# Patient Record
Sex: FEMALE | Race: WHITE | Employment: UNEMPLOYED | URBAN - METROPOLITAN AREA
[De-identification: names, ages, dates, MRNs, and addresses within clinical notes are randomized per-mention and may not be internally consistent; named-entity substitution may affect disease eponyms.]

---

## 2017-01-04 ENCOUNTER — OFFICE VISIT (OUTPATIENT)
Dept: ORTHOPEDIC SURGERY | Age: 32
End: 2017-01-04

## 2017-01-04 VITALS — WEIGHT: 260 LBS | HEIGHT: 64 IN | BODY MASS INDEX: 44.39 KG/M2

## 2017-01-04 DIAGNOSIS — S93.325A LISFRANC DISLOCATION, LEFT, INITIAL ENCOUNTER: ICD-10-CM

## 2017-01-04 DIAGNOSIS — S82.64XA CLOSED NONDISPLACED FRACTURE OF LATERAL MALLEOLUS OF RIGHT FIBULA, INITIAL ENCOUNTER: Primary | ICD-10-CM

## 2017-01-04 DIAGNOSIS — S92.344A CLOSED NONDISPLACED FRACTURE OF FOURTH METATARSAL BONE OF RIGHT FOOT, INITIAL ENCOUNTER: ICD-10-CM

## 2017-01-04 PROCEDURE — 73630 X-RAY EXAM OF FOOT: CPT | Performed by: NURSE PRACTITIONER

## 2017-01-04 PROCEDURE — 73610 X-RAY EXAM OF ANKLE: CPT | Performed by: NURSE PRACTITIONER

## 2017-01-04 PROCEDURE — 99024 POSTOP FOLLOW-UP VISIT: CPT | Performed by: NURSE PRACTITIONER

## 2017-01-22 PROBLEM — Z09 FOLLOW-UP EXAM: Status: ACTIVE | Noted: 2017-01-22

## 2017-02-03 ENCOUNTER — OFFICE VISIT (OUTPATIENT)
Dept: ORTHOPEDIC SURGERY | Age: 32
End: 2017-02-03

## 2017-02-03 VITALS — HEIGHT: 64 IN | WEIGHT: 279 LBS | BODY MASS INDEX: 47.63 KG/M2

## 2017-02-03 DIAGNOSIS — S92.344A CLOSED NONDISPLACED FRACTURE OF FOURTH METATARSAL BONE OF RIGHT FOOT, INITIAL ENCOUNTER: Primary | ICD-10-CM

## 2017-02-03 DIAGNOSIS — S82.64XA CLOSED NONDISPLACED FRACTURE OF LATERAL MALLEOLUS OF RIGHT FIBULA, INITIAL ENCOUNTER: ICD-10-CM

## 2017-02-03 PROCEDURE — 73630 X-RAY EXAM OF FOOT: CPT | Performed by: ORTHOPAEDIC SURGERY

## 2017-02-03 PROCEDURE — 73610 X-RAY EXAM OF ANKLE: CPT | Performed by: ORTHOPAEDIC SURGERY

## 2017-02-03 PROCEDURE — 99214 OFFICE O/P EST MOD 30 MIN: CPT | Performed by: ORTHOPAEDIC SURGERY

## 2017-02-14 ENCOUNTER — HOSPITAL ENCOUNTER (OUTPATIENT)
Dept: OTHER | Age: 32
Discharge: HOME | End: 2017-02-28
Attending: ORTHOPAEDIC SURGERY | Admitting: ORTHOPAEDIC SURGERY

## 2017-02-14 ASSESSMENT — PAIN DESCRIPTION - ORIENTATION: ORIENTATION: LEFT;RIGHT

## 2017-02-14 ASSESSMENT — PAIN DESCRIPTION - DESCRIPTORS: DESCRIPTORS: CONSTANT

## 2017-02-14 ASSESSMENT — PAIN DESCRIPTION - PROGRESSION: CLINICAL_PROGRESSION: GRADUALLY WORSENING

## 2017-02-14 ASSESSMENT — PAIN DESCRIPTION - LOCATION: LOCATION: FOOT;ANKLE

## 2017-02-16 ENCOUNTER — HOSPITAL ENCOUNTER (OUTPATIENT)
Dept: PHYSICAL THERAPY | Age: 32
Discharge: HOME | End: 2017-02-16
Admitting: ORTHOPAEDIC SURGERY

## 2017-02-20 ENCOUNTER — HOSPITAL ENCOUNTER (OUTPATIENT)
Dept: PHYSICAL THERAPY | Age: 32
Discharge: HOME | End: 2017-02-20
Admitting: ORTHOPAEDIC SURGERY

## 2017-02-22 ENCOUNTER — HOSPITAL ENCOUNTER (OUTPATIENT)
Dept: PHYSICAL THERAPY | Age: 32
Discharge: HOME | End: 2017-02-22
Admitting: ORTHOPAEDIC SURGERY

## 2017-03-01 ENCOUNTER — HOSPITAL ENCOUNTER (OUTPATIENT)
Dept: PHYSICAL THERAPY | Age: 32
Discharge: HOME | End: 2017-03-02
Admitting: ORTHOPAEDIC SURGERY

## 2017-03-03 ENCOUNTER — HOSPITAL ENCOUNTER (OUTPATIENT)
Dept: PHYSICAL THERAPY | Age: 32
Discharge: HOME | End: 2017-03-04
Admitting: ORTHOPAEDIC SURGERY

## 2017-03-06 ENCOUNTER — HOSPITAL ENCOUNTER (OUTPATIENT)
Dept: PHYSICAL THERAPY | Age: 32
Discharge: HOME | End: 2017-03-07
Admitting: ORTHOPAEDIC SURGERY

## 2017-03-08 ENCOUNTER — HOSPITAL ENCOUNTER (OUTPATIENT)
Dept: PHYSICAL THERAPY | Age: 32
Discharge: HOME | End: 2017-03-09
Admitting: ORTHOPAEDIC SURGERY

## 2017-03-13 ENCOUNTER — HOSPITAL ENCOUNTER (OUTPATIENT)
Dept: PHYSICAL THERAPY | Age: 32
Discharge: HOME | End: 2017-03-14
Admitting: ORTHOPAEDIC SURGERY

## 2017-03-15 ENCOUNTER — HOSPITAL ENCOUNTER (OUTPATIENT)
Dept: PHYSICAL THERAPY | Age: 32
Discharge: HOME | End: 2017-03-16
Admitting: ORTHOPAEDIC SURGERY

## 2017-03-20 ENCOUNTER — HOSPITAL ENCOUNTER (OUTPATIENT)
Dept: PHYSICAL THERAPY | Age: 32
Discharge: HOME | End: 2017-03-21
Admitting: ORTHOPAEDIC SURGERY

## 2017-03-22 ENCOUNTER — HOSPITAL ENCOUNTER (OUTPATIENT)
Dept: PHYSICAL THERAPY | Age: 32
Discharge: HOME | End: 2017-03-23
Admitting: ORTHOPAEDIC SURGERY

## 2017-03-29 ENCOUNTER — HOSPITAL ENCOUNTER (OUTPATIENT)
Dept: PHYSICAL THERAPY | Age: 32
Discharge: HOME | End: 2017-03-30
Admitting: ORTHOPAEDIC SURGERY

## 2017-03-31 ENCOUNTER — HOSPITAL ENCOUNTER (OUTPATIENT)
Dept: PHYSICAL THERAPY | Age: 32
Discharge: HOME | End: 2017-04-01
Admitting: ORTHOPAEDIC SURGERY

## 2017-04-03 ENCOUNTER — HOSPITAL ENCOUNTER (OUTPATIENT)
Dept: PHYSICAL THERAPY | Age: 32
Discharge: HOME | End: 2017-04-04
Admitting: ORTHOPAEDIC SURGERY

## 2017-04-05 ENCOUNTER — HOSPITAL ENCOUNTER (OUTPATIENT)
Dept: PHYSICAL THERAPY | Age: 32
Discharge: HOME | End: 2017-04-06
Admitting: ORTHOPAEDIC SURGERY

## 2017-05-05 ENCOUNTER — OFFICE VISIT (OUTPATIENT)
Dept: ORTHOPEDIC SURGERY | Age: 32
End: 2017-05-05

## 2017-05-05 VITALS — HEIGHT: 64 IN | BODY MASS INDEX: 48.14 KG/M2 | WEIGHT: 282 LBS

## 2017-05-05 DIAGNOSIS — S92.344A CLOSED NONDISPLACED FRACTURE OF FOURTH METATARSAL BONE OF RIGHT FOOT, INITIAL ENCOUNTER: Primary | ICD-10-CM

## 2017-05-05 DIAGNOSIS — S82.65XA CLOSED NONDISPLACED FRACTURE OF LATERAL MALLEOLUS OF LEFT FIBULA, INITIAL ENCOUNTER: ICD-10-CM

## 2017-05-05 PROCEDURE — 73610 X-RAY EXAM OF ANKLE: CPT | Performed by: ORTHOPAEDIC SURGERY

## 2017-05-05 PROCEDURE — 73630 X-RAY EXAM OF FOOT: CPT | Performed by: ORTHOPAEDIC SURGERY

## 2017-05-05 PROCEDURE — 99213 OFFICE O/P EST LOW 20 MIN: CPT | Performed by: ORTHOPAEDIC SURGERY

## 2017-08-21 ENCOUNTER — OFFICE VISIT (OUTPATIENT)
Dept: GYNECOLOGY | Age: 32
End: 2017-08-21

## 2017-08-21 VITALS
OXYGEN SATURATION: 98 % | BODY MASS INDEX: 45.42 KG/M2 | HEIGHT: 66 IN | RESPIRATION RATE: 16 BRPM | SYSTOLIC BLOOD PRESSURE: 125 MMHG | TEMPERATURE: 97.7 F | WEIGHT: 282.6 LBS | HEART RATE: 89 BPM | DIASTOLIC BLOOD PRESSURE: 76 MMHG

## 2017-08-21 DIAGNOSIS — Z01.419 WELL WOMAN EXAM WITH ROUTINE GYNECOLOGICAL EXAM: Primary | ICD-10-CM

## 2017-08-21 DIAGNOSIS — E28.2 PCOS (POLYCYSTIC OVARIAN SYNDROME): ICD-10-CM

## 2017-08-21 DIAGNOSIS — N93.9 ABNORMAL UTERINE BLEEDING: ICD-10-CM

## 2017-08-21 LAB — HCG QUALITATIVE: NEGATIVE

## 2017-08-21 PROCEDURE — 99385 PREV VISIT NEW AGE 18-39: CPT | Performed by: OBSTETRICS & GYNECOLOGY

## 2017-08-21 RX ORDER — LEVOTHYROXINE SODIUM 0.05 MG/1
TABLET ORAL
COMMUNITY
Start: 2016-06-13 | End: 2018-04-12 | Stop reason: ALTCHOICE

## 2017-08-21 RX ORDER — ATOMOXETINE 80 MG/1
80 CAPSULE ORAL DAILY
COMMUNITY
Start: 2012-05-24 | End: 2018-04-12 | Stop reason: ALTCHOICE

## 2017-08-21 RX ORDER — PENICILLIN V POTASSIUM 250 MG/1
TABLET ORAL
COMMUNITY

## 2017-08-22 LAB
ESTIMATED AVERAGE GLUCOSE: 99.7 MG/DL
ESTRADIOL LEVEL: 39 PG/ML
FOLLICLE STIMULATING HORMONE: 6.9 MIU/ML
HBA1C MFR BLD: 5.1 %
LUTEINIZING HORMONE: 13.7 MIU/ML
PROLACTIN: 22.7 NG/ML
TSH REFLEX: 2.11 UIU/ML (ref 0.27–4.2)

## 2017-08-23 LAB — DHEAS (DHEA SULFATE): 101 UG/DL (ref 45–270)

## 2017-08-24 ENCOUNTER — HOSPITAL ENCOUNTER (OUTPATIENT)
Dept: ULTRASOUND IMAGING | Age: 32
Discharge: HOME | End: 2017-08-24
Attending: OBSTETRICS & GYNECOLOGY | Admitting: OBSTETRICS & GYNECOLOGY

## 2017-08-24 DIAGNOSIS — E28.2 PCOS (POLYCYSTIC OVARIAN SYNDROME): ICD-10-CM

## 2017-08-24 DIAGNOSIS — R10.9 ABDOMINAL PAIN: ICD-10-CM

## 2017-08-24 LAB
17-OH PROGESTERONE LCMS: 11.4 NG/DL
HPV COMMENT: NORMAL
HPV TYPE 16: NOT DETECTED
HPV TYPE 18: NOT DETECTED
HPVOH (OTHER TYPES): NOT DETECTED

## 2017-09-19 ENCOUNTER — OFFICE VISIT (OUTPATIENT)
Dept: GYNECOLOGY | Age: 32
End: 2017-09-19

## 2017-09-19 VITALS
HEIGHT: 66 IN | WEIGHT: 283 LBS | HEART RATE: 84 BPM | DIASTOLIC BLOOD PRESSURE: 77 MMHG | TEMPERATURE: 98.1 F | SYSTOLIC BLOOD PRESSURE: 124 MMHG | BODY MASS INDEX: 45.48 KG/M2

## 2017-09-19 DIAGNOSIS — N93.9 ABNORMAL UTERINE BLEEDING: Primary | ICD-10-CM

## 2017-09-19 PROCEDURE — 99213 OFFICE O/P EST LOW 20 MIN: CPT | Performed by: OBSTETRICS & GYNECOLOGY

## 2017-09-26 RX ORDER — MEDROXYPROGESTERONE ACETATE 10 MG/1
10 TABLET ORAL DAILY
Qty: 10 TABLET | Refills: 3 | Status: SHIPPED | OUTPATIENT
Start: 2017-09-26 | End: 2018-01-29 | Stop reason: SDUPTHER

## 2017-12-19 ENCOUNTER — OFFICE VISIT (OUTPATIENT)
Dept: GYNECOLOGY | Age: 32
End: 2017-12-19

## 2017-12-19 VITALS
HEIGHT: 66 IN | DIASTOLIC BLOOD PRESSURE: 82 MMHG | BODY MASS INDEX: 45.42 KG/M2 | SYSTOLIC BLOOD PRESSURE: 124 MMHG | TEMPERATURE: 97.2 F | HEART RATE: 89 BPM | WEIGHT: 282.6 LBS

## 2017-12-19 DIAGNOSIS — N93.9 ABNORMAL UTERINE BLEEDING: Primary | ICD-10-CM

## 2017-12-19 PROCEDURE — 99213 OFFICE O/P EST LOW 20 MIN: CPT | Performed by: OBSTETRICS & GYNECOLOGY

## 2017-12-19 PROCEDURE — G8417 CALC BMI ABV UP PARAM F/U: HCPCS | Performed by: OBSTETRICS & GYNECOLOGY

## 2017-12-19 PROCEDURE — 1036F TOBACCO NON-USER: CPT | Performed by: OBSTETRICS & GYNECOLOGY

## 2017-12-19 PROCEDURE — G8484 FLU IMMUNIZE NO ADMIN: HCPCS | Performed by: OBSTETRICS & GYNECOLOGY

## 2017-12-19 PROCEDURE — G8427 DOCREV CUR MEDS BY ELIG CLIN: HCPCS | Performed by: OBSTETRICS & GYNECOLOGY

## 2017-12-19 NOTE — PROGRESS NOTES
pts doing well on the cyclic provera. She notes that her periods are now more regular but she is continuing to have discomfort. I explained that she needs to remain on the provera while trying to manage her weight, which has remained stable though elevated. The longer she remains on the medicine the improved her pain should become. All questions answered. F/u annual gyn exam.  15 min >50% of time was spent discussing findings, management, and treatment options.

## 2018-01-29 RX ORDER — MEDROXYPROGESTERONE ACETATE 10 MG/1
10 TABLET ORAL DAILY
Qty: 10 TABLET | Refills: 3 | Status: SHIPPED | OUTPATIENT
Start: 2018-01-29 | End: 2018-05-24 | Stop reason: SDUPTHER

## 2018-01-29 NOTE — TELEPHONE ENCOUNTER
Patient is calling for a refill of her cyclic provera. Patient states that this medication has been effective. Patient last seen in office on 12/19/17. Pended medication, verified pharmacy, routing to MD to review.

## 2018-03-12 ENCOUNTER — HOSPITAL ENCOUNTER (OUTPATIENT)
Dept: MRI IMAGING | Age: 33
Discharge: HOME | End: 2018-03-12
Admitting: NURSE PRACTITIONER

## 2018-03-12 DIAGNOSIS — R10.9 ABDOMINAL PAIN, UNSPECIFIED ABDOMINAL LOCATION: ICD-10-CM

## 2018-03-12 DIAGNOSIS — R10.9 ABDOMINAL PAIN: ICD-10-CM

## 2018-03-26 ENCOUNTER — OFFICE VISIT (OUTPATIENT)
Dept: SURGERY | Age: 33
End: 2018-03-26

## 2018-03-26 VITALS
HEIGHT: 66 IN | WEIGHT: 282 LBS | SYSTOLIC BLOOD PRESSURE: 114 MMHG | DIASTOLIC BLOOD PRESSURE: 80 MMHG | BODY MASS INDEX: 45.32 KG/M2

## 2018-03-26 DIAGNOSIS — R10.31 RIGHT GROIN PAIN: Primary | ICD-10-CM

## 2018-03-26 PROCEDURE — G8484 FLU IMMUNIZE NO ADMIN: HCPCS | Performed by: SURGERY

## 2018-03-26 PROCEDURE — 1036F TOBACCO NON-USER: CPT | Performed by: SURGERY

## 2018-03-26 PROCEDURE — G8427 DOCREV CUR MEDS BY ELIG CLIN: HCPCS | Performed by: SURGERY

## 2018-03-26 PROCEDURE — G8417 CALC BMI ABV UP PARAM F/U: HCPCS | Performed by: SURGERY

## 2018-03-26 PROCEDURE — 99213 OFFICE O/P EST LOW 20 MIN: CPT | Performed by: SURGERY

## 2018-03-26 ASSESSMENT — ENCOUNTER SYMPTOMS: ABDOMINAL PAIN: 1

## 2018-03-26 NOTE — LETTER
[facility] Vascular Surgery  [address] [place] [address]  Phone: [phone]  Fax: [phone]    Diomedes Sahu MD        March 26, 2018     Diana Summers [address]  [address] [id number]    Patient: Danita Pacheco  MR Number: [id number]  YOB: 1985  Date of Visit: 3/26/2018    Dear Dr. Diana Summers:    Myriam Min was in today regarding her right groin pain. It continues to worsen and seems to be associated with a mass in the subcutaneous layer. This is likely a lipoma and we are planning to remove that mass. It is less likely a hernia but we will be prepared to proceed with hernia repair if needed. If you have questions, please do not hesitate to call me. I look forward to following Myriam Min along with you.     Sincerely,          Diomedes Sahu MD

## 2018-03-26 NOTE — LETTER
Surgery Scheduling Form:      DEMOGRAPHICS:                                                                                                         .    Patient Name:  Chelsey aDvis  Patient :  1985   Patient SS#:      Patient Phone:  [phone] (home) [phone] (work) Alt. Patient Phone:                     Patient Address:  [address]    PCP:  Lord Milena CNP  Insurance:  Payor: Jaki Noe / Plan: Velasco Low / Product Type: *No Product type* /   Insurance ID Number:    Payor/Plan Subscr  Sex Relation Sub. Ins. ID Effective Group Num   1. CARESOUR[employer]IGH - * ROBERT PAZ T 1985 Female Self [id number] [id number]         DIAGNOSIS & PROCEDURE:                                                                                       .    Diagnosis:   Right groin pain, right groin mass  Operation:  Right groin exploration, possible hernia repair, possible lipoma removal  Location:  [facility]  Surgeon: Machelle Zavala MD    [facility] INFORMATION:                                                                                    .    Surgeon's Scheduling Instruction:  elective  Requested Date: 18   OR Time: 8:45          Patient Arrival Time: 7:15  OR Time Required:  60  Minutes  Anesthesia:  MAC/TIVA  Equipment:                                                           SA Required:  Yes  Status:  Outpatient          Standard C-Arm:  No  PAT Required:   Yes                             Best Time to Call:  10 am  Patient Requested to see PCP for Pre-op H & P:  Dr Aimee Maya will do H & P  Special Comments:     PT NOTIFIED Claudette Taylor MD       PRE-CERTIFICATION INFORMATION:                                                                           .    Procedure:       CPT Code Modifier

## 2018-03-26 NOTE — LETTER
[facility] Vascular Surgery  [address] [address]  Phone: [phone]  Fax: [phone]        March 26, 2018    Fawn Ornelas  [address]  [address] [facility] [address]      Dear Mc Fierro: The following arrangements have been completed for your procedure:  PLEASE FOLLOW THE BELOW DIRECTIONS CAREFULLY:                               Hospital: [facility]                                            [address] [facility] [facility] [address]                             Date of Surgery:  04-                             Time of Surgery:  8:45 AM                                Arrival Time at Hospital:  7:15 AM    1. You are to have NOTHING TO EAT OR DRINK after midnight the night before your procedure. You may take any Heart or Blood Pressure medications with a sip of water to wash them down. Please refrain from any Alcohol or Caffeine or caffeine products for 24 hours prior to surgery. 2.  You will need someone to drive you home following your procedure. 3.  If you develop a fever, please notify our office as soon as possible. Please keep in mind that emergencies requiring immediate intervention do occur, which creates delays. The staff will do everything possible to ensure your procedure remains on time. Please remember to bring a Photo ID & Insurance Card with you. Please  report in at the Surgery Desk down the right-hand hallway on the first floor. If the hospital requires any further information/testing, they will contact you. If you have any questions or concerns, please don't hesitate to call.     Sincerely,      Aaron Medina - Patient Services Specialist for:  Karthik Hutchison MD

## 2018-03-26 NOTE — PROGRESS NOTES
1. Right groin pain             Plan:      Right groin exploration    Removal of lipoma, possible hernia repair    The risks, benefits and alternatives to the planned procedure were discussed. Patient expressed an understanding and is willing to proceed.

## 2018-04-12 ENCOUNTER — PAT TELEPHONE (OUTPATIENT)
Dept: PREADMISSION TESTING | Age: 33
End: 2018-04-12

## 2018-04-12 VITALS — HEIGHT: 66 IN | WEIGHT: 279 LBS | BODY MASS INDEX: 44.84 KG/M2

## 2018-04-12 RX ORDER — IBUPROFEN/DIPHENHYDRAMINE CIT 200MG-38MG
1 TABLET ORAL DAILY
COMMUNITY

## 2018-04-12 RX ORDER — LEVOTHYROXINE SODIUM 0.1 MG/1
100 TABLET ORAL DAILY
COMMUNITY

## 2018-04-12 RX ORDER — IRON POLYSACCHARIDE COMPLEX 180 MG
1 CAPSULE ORAL DAILY
COMMUNITY
Start: 2018-02-03 | End: 2019-08-27 | Stop reason: ALTCHOICE

## 2018-04-12 ASSESSMENT — PAIN SCALES - GENERAL: PAINLEVEL_OUTOF10: 7

## 2018-04-12 ASSESSMENT — PAIN DESCRIPTION - PAIN TYPE: TYPE: ACUTE PAIN

## 2018-04-12 ASSESSMENT — PAIN - FUNCTIONAL ASSESSMENT: PAIN_FUNCTIONAL_ASSESSMENT: 0-10

## 2018-04-12 ASSESSMENT — PAIN DESCRIPTION - DESCRIPTORS: DESCRIPTORS: STABBING

## 2018-04-12 ASSESSMENT — PAIN DESCRIPTION - DIRECTION: RADIATING_TOWARDS: PELVIC AREA

## 2018-04-13 ENCOUNTER — HOSPITAL ENCOUNTER (OUTPATIENT)
Dept: SURGERY | Age: 33
Discharge: HOME | End: 2018-04-13
Attending: SURGERY | Admitting: SURGERY

## 2018-04-13 VITALS
RESPIRATION RATE: 16 BRPM | HEIGHT: 66 IN | DIASTOLIC BLOOD PRESSURE: 89 MMHG | OXYGEN SATURATION: 96 % | TEMPERATURE: 97.2 F | WEIGHT: 279 LBS | BODY MASS INDEX: 44.84 KG/M2 | SYSTOLIC BLOOD PRESSURE: 146 MMHG | HEART RATE: 97 BPM

## 2018-04-13 DIAGNOSIS — D17.1 LIPOMA OF TORSO: Primary | ICD-10-CM

## 2018-04-13 LAB
HCT VFR BLD CALC: 36.6 % (ref 36–48)
HEMOGLOBIN: 11.7 G/DL (ref 12–16)
MCH RBC QN AUTO: 23.2 PG (ref 26–34)
MCHC RBC AUTO-ENTMCNC: 31.9 G/DL (ref 31–36)
MCV RBC AUTO: 72.9 FL (ref 80–100)
PDW BLD-RTO: 22 % (ref 12.4–15.4)
PLATELET # BLD: 85 K/UL (ref 135–450)
PLATELET SLIDE REVIEW: ABNORMAL
PMV BLD AUTO: 10.5 FL (ref 5–10.5)
PREGNANCY, URINE: NEGATIVE
RBC # BLD: 5.03 M/UL (ref 4–5.2)
SLIDE REVIEW: ABNORMAL
WBC # BLD: 8.6 K/UL (ref 4–11)

## 2018-04-13 PROCEDURE — 22903 EXC ABD LES SC 3 CM/>: CPT | Performed by: SURGERY

## 2018-04-13 RX ORDER — FENTANYL CITRATE 50 UG/ML
25 INJECTION, SOLUTION INTRAMUSCULAR; INTRAVENOUS EVERY 5 MIN PRN
Status: COMPLETED | OUTPATIENT
Start: 2018-04-13 | End: 2018-04-13

## 2018-04-13 RX ORDER — FENTANYL CITRATE 50 UG/ML
25 INJECTION, SOLUTION INTRAMUSCULAR; INTRAVENOUS EVERY 5 MIN PRN
Status: DISCONTINUED | OUTPATIENT
Start: 2018-04-13 | End: 2018-04-14 | Stop reason: HOSPADM

## 2018-04-13 RX ORDER — SODIUM CHLORIDE 0.9 % (FLUSH) 0.9 %
10 SYRINGE (ML) INJECTION EVERY 12 HOURS SCHEDULED
Status: DISCONTINUED | OUTPATIENT
Start: 2018-04-13 | End: 2018-04-14 | Stop reason: HOSPADM

## 2018-04-13 RX ORDER — SODIUM CHLORIDE 9 MG/ML
INJECTION, SOLUTION INTRAVENOUS CONTINUOUS
Status: DISCONTINUED | OUTPATIENT
Start: 2018-04-13 | End: 2018-04-14 | Stop reason: HOSPADM

## 2018-04-13 RX ORDER — OXYCODONE HYDROCHLORIDE AND ACETAMINOPHEN 5; 325 MG/1; MG/1
1 TABLET ORAL EVERY 6 HOURS PRN
Qty: 12 TABLET | Refills: 0 | Status: SHIPPED | OUTPATIENT
Start: 2018-04-13 | End: 2018-04-16

## 2018-04-13 RX ORDER — ONDANSETRON 2 MG/ML
4 INJECTION INTRAMUSCULAR; INTRAVENOUS
Status: ACTIVE | OUTPATIENT
Start: 2018-04-13 | End: 2018-04-13

## 2018-04-13 RX ORDER — OXYCODONE HYDROCHLORIDE AND ACETAMINOPHEN 5; 325 MG/1; MG/1
2 TABLET ORAL PRN
Status: ACTIVE | OUTPATIENT
Start: 2018-04-13 | End: 2018-04-13

## 2018-04-13 RX ORDER — OXYCODONE HYDROCHLORIDE AND ACETAMINOPHEN 5; 325 MG/1; MG/1
1 TABLET ORAL PRN
Status: ACTIVE | OUTPATIENT
Start: 2018-04-13 | End: 2018-04-13

## 2018-04-13 RX ORDER — SODIUM CHLORIDE 0.9 % (FLUSH) 0.9 %
10 SYRINGE (ML) INJECTION PRN
Status: DISCONTINUED | OUTPATIENT
Start: 2018-04-13 | End: 2018-04-14 | Stop reason: HOSPADM

## 2018-04-13 RX ADMIN — FENTANYL CITRATE 25 MCG: 50 INJECTION, SOLUTION INTRAMUSCULAR; INTRAVENOUS at 10:35

## 2018-04-13 RX ADMIN — FENTANYL CITRATE 25 MCG: 50 INJECTION, SOLUTION INTRAMUSCULAR; INTRAVENOUS at 10:45

## 2018-04-13 RX ADMIN — FENTANYL CITRATE 25 MCG: 50 INJECTION, SOLUTION INTRAMUSCULAR; INTRAVENOUS at 10:40

## 2018-04-13 RX ADMIN — FENTANYL CITRATE 25 MCG: 50 INJECTION, SOLUTION INTRAMUSCULAR; INTRAVENOUS at 10:51

## 2018-04-13 RX ADMIN — FENTANYL CITRATE 25 MCG: 50 INJECTION, SOLUTION INTRAMUSCULAR; INTRAVENOUS at 10:30

## 2018-04-13 ASSESSMENT — PAIN DESCRIPTION - ORIENTATION: ORIENTATION: RIGHT

## 2018-04-13 ASSESSMENT — PAIN SCALES - GENERAL
PAINLEVEL_OUTOF10: 7
PAINLEVEL_OUTOF10: 4
PAINLEVEL_OUTOF10: 7
PAINLEVEL_OUTOF10: 5
PAINLEVEL_OUTOF10: 10
PAINLEVEL_OUTOF10: 7
PAINLEVEL_OUTOF10: 10

## 2018-04-13 ASSESSMENT — PAIN - FUNCTIONAL ASSESSMENT: PAIN_FUNCTIONAL_ASSESSMENT: 0-10

## 2018-04-13 ASSESSMENT — PAIN DESCRIPTION - PAIN TYPE: TYPE: ACUTE PAIN;SURGICAL PAIN

## 2018-04-13 ASSESSMENT — ENCOUNTER SYMPTOMS: SHORTNESS OF BREATH: 0

## 2018-04-13 ASSESSMENT — PAIN DESCRIPTION - LOCATION: LOCATION: GROIN

## 2018-04-13 ASSESSMENT — LIFESTYLE VARIABLES: SMOKING_STATUS: 0

## 2018-04-23 ENCOUNTER — OFFICE VISIT (OUTPATIENT)
Dept: SURGERY | Age: 33
End: 2018-04-23

## 2018-04-23 DIAGNOSIS — D17.1 LIPOMA OF TORSO: Primary | ICD-10-CM

## 2018-04-23 PROCEDURE — 99024 POSTOP FOLLOW-UP VISIT: CPT | Performed by: SURGERY

## 2018-05-24 RX ORDER — MEDROXYPROGESTERONE ACETATE 10 MG/1
TABLET ORAL
Qty: 10 TABLET | Refills: 2 | Status: SHIPPED | OUTPATIENT
Start: 2018-05-24 | End: 2018-08-22 | Stop reason: SDUPTHER

## 2018-08-22 ENCOUNTER — OFFICE VISIT (OUTPATIENT)
Dept: GYNECOLOGY | Age: 33
End: 2018-08-22

## 2018-08-22 VITALS
TEMPERATURE: 97.1 F | SYSTOLIC BLOOD PRESSURE: 124 MMHG | DIASTOLIC BLOOD PRESSURE: 84 MMHG | RESPIRATION RATE: 16 BRPM | HEIGHT: 66 IN | BODY MASS INDEX: 43.84 KG/M2 | HEART RATE: 92 BPM | WEIGHT: 272.8 LBS | OXYGEN SATURATION: 98 %

## 2018-08-22 DIAGNOSIS — N93.9 ABNORMAL UTERINE BLEEDING: Primary | ICD-10-CM

## 2018-08-22 DIAGNOSIS — Z01.419 WELL WOMAN EXAM WITH ROUTINE GYNECOLOGICAL EXAM: ICD-10-CM

## 2018-08-22 PROCEDURE — 99395 PREV VISIT EST AGE 18-39: CPT | Performed by: OBSTETRICS & GYNECOLOGY

## 2018-08-22 RX ORDER — MEDROXYPROGESTERONE ACETATE 10 MG/1
TABLET ORAL
Qty: 10 TABLET | Refills: 11 | Status: SHIPPED | OUTPATIENT
Start: 2018-08-22 | End: 2019-08-27 | Stop reason: SDUPTHER

## 2018-08-22 NOTE — PROGRESS NOTES
Subjective:      Patient ID: Andria Perez is a 35 y.o. female. Gynecologic Exam     Other     pts here for annual gyn exam.  Notes that she is having regular monthly menses but in July she had an additional 3 days of bleeding at the end of the month. Denies complaints. Going to [place] at the end of the month for 9 days. Review of Systems Pertinent review of systems items discussed above. All others systems items not discussed above were negative. Objective:   Physical Exam   Constitutional: She is oriented to person, place, and time. She appears well-developed and well-nourished. HENT:   Head: Normocephalic and atraumatic. Neck: No tracheal deviation present. No thyromegaly present. Cardiovascular: Normal rate, regular rhythm and normal heart sounds. No murmur heard. Pulmonary/Chest: Effort normal and breath sounds normal. No respiratory distress. She has no wheezes. She has no rales. Right breast exhibits no mass, no nipple discharge and no skin change. Left breast exhibits no mass, no nipple discharge and no skin change. Abdominal: Soft. She exhibits no distension and no mass. There is no tenderness. There is no rebound. Genitourinary: Vagina normal and uterus normal. Rectal exam shows no external hemorrhoid. No breast tenderness (no masses), discharge or bleeding. There is no lesion on the right labia. There is no lesion on the left labia. Uterus is not deviated, not enlarged, not fixed and not tender. Cervix exhibits no motion tenderness, no discharge and no friability. Right adnexum displays no mass and no tenderness. Left adnexum displays no mass and no tenderness. No foreign body in the vagina. No vaginal discharge found. Genitourinary Comments: Pap performed. Musculoskeletal: Normal range of motion. Lymphadenopathy:     She has no cervical adenopathy. Neurological: She is alert and oriented to person, place, and time.        Assessment:      Normal gyn exam      Plan:

## 2018-08-24 LAB
HPV COMMENT: NORMAL
HPV TYPE 16: NOT DETECTED
HPV TYPE 18: NOT DETECTED
HPVOH (OTHER TYPES): NOT DETECTED

## 2018-09-27 PROBLEM — Z09 FOLLOW-UP EXAM: Status: RESOLVED | Noted: 2017-01-22 | Resolved: 2018-09-27

## 2019-03-14 ENCOUNTER — OFFICE VISIT (OUTPATIENT)
Dept: SURGERY | Age: 34
End: 2019-03-14
Payer: COMMERCIAL

## 2019-03-14 VITALS
HEIGHT: 66 IN | DIASTOLIC BLOOD PRESSURE: 80 MMHG | BODY MASS INDEX: 43.71 KG/M2 | WEIGHT: 272 LBS | SYSTOLIC BLOOD PRESSURE: 124 MMHG

## 2019-03-14 DIAGNOSIS — R10.31 RIGHT GROIN PAIN: Primary | ICD-10-CM

## 2019-03-14 PROCEDURE — 1036F TOBACCO NON-USER: CPT | Performed by: SURGERY

## 2019-03-14 PROCEDURE — G8427 DOCREV CUR MEDS BY ELIG CLIN: HCPCS | Performed by: SURGERY

## 2019-03-14 PROCEDURE — 99213 OFFICE O/P EST LOW 20 MIN: CPT | Performed by: SURGERY

## 2019-03-14 PROCEDURE — G8417 CALC BMI ABV UP PARAM F/U: HCPCS | Performed by: SURGERY

## 2019-03-14 PROCEDURE — G8484 FLU IMMUNIZE NO ADMIN: HCPCS | Performed by: SURGERY

## 2019-03-14 RX ORDER — LUBIPROSTONE 24 UG/1
CAPSULE, GELATIN COATED ORAL
Refills: 2 | COMMUNITY
Start: 2019-02-21 | End: 2019-08-27 | Stop reason: ALTCHOICE

## 2019-03-14 ASSESSMENT — ENCOUNTER SYMPTOMS: ABDOMINAL PAIN: 1

## 2019-03-26 ENCOUNTER — HOSPITAL ENCOUNTER (OUTPATIENT)
Dept: CT IMAGING | Age: 34
Discharge: HOME | End: 2019-03-26
Payer: COMMERCIAL

## 2019-03-26 DIAGNOSIS — R10.31 RIGHT GROIN PAIN: ICD-10-CM

## 2019-03-26 PROCEDURE — 6360000004 HC RX CONTRAST MEDICATION: Performed by: SURGERY

## 2019-03-26 PROCEDURE — 72193 CT PELVIS W/DYE: CPT

## 2019-03-26 RX ADMIN — IOPAMIDOL 75 ML: 755 INJECTION, SOLUTION INTRAVENOUS at 13:33

## 2019-03-26 RX ADMIN — IOHEXOL 50 ML: 240 INJECTION, SOLUTION INTRATHECAL; INTRAVASCULAR; INTRAVENOUS; ORAL at 13:33

## 2019-05-21 LAB
C-REACTIVE PROTEIN: 15.6 MG/L (ref 0–5.1)
C4 COMPLEMENT: 32.5 MG/DL (ref 10–40)
FERRITIN: 40 NG/ML (ref 15–150)
FOLATE: >20 NG/ML (ref 4.78–24.2)
HEMOGLOBIN: 13.7 G/DL (ref 12–16)
IRON SATURATION: 32 % (ref 15–50)
IRON: 99 UG/DL (ref 37–145)
T4 FREE: 1.5 NG/DL (ref 0.9–1.8)
TOTAL IRON BINDING CAPACITY: 313 UG/DL (ref 260–445)
TSH SERPL DL<=0.05 MIU/L-ACNC: 2.17 UIU/ML (ref 0.27–4.2)
VITAMIN B-12: 582 PG/ML (ref 211–911)

## 2019-05-24 LAB
COPROPORPHYRIN I URINE (/MOL CRT): 3 UMOL/MOL CRT (ref 0–6)
COPROPORPHYRIN III URINE (/MOL CRT): 8 UMOL/MOL CRT (ref 0–14)
CREATININE 24 HOUR URINE: NORMAL MG/D (ref 700–1600)
CREATININE URINE: 166 MG/DL
HEPTACARBOXYLATE PORPHYRIN 24 HOUR URINE: 0 UMOL/MOL CRT (ref 0–2)
HOURS COLLECTED: NORMAL HR
PGB/24HR: NORMAL UMOL/D (ref 0–11)
PGB/L: 4.7 UMOL/L (ref 0–8.8)
PORPHYRIN URINE INTERPRETATION: NEGATIVE
URINE TOTAL VOLUME: NORMAL ML
UROPORPHYRIN 24 HOUR URINE: 1 UMOL/MOL CRT (ref 0–4)

## 2019-05-25 LAB
ARSENIC BLOOD: <10 UG/L (ref 0–12)
LEAD LEVEL BLOOD: <2 UG/DL (ref 0–4.9)
MERCURY BLOOD: <2.5 UG/L (ref 0–10)

## 2019-08-27 ENCOUNTER — OFFICE VISIT (OUTPATIENT)
Dept: GYNECOLOGY | Age: 34
End: 2019-08-27
Payer: COMMERCIAL

## 2019-08-27 VITALS
OXYGEN SATURATION: 96 % | BODY MASS INDEX: 44.65 KG/M2 | WEIGHT: 277.8 LBS | DIASTOLIC BLOOD PRESSURE: 86 MMHG | RESPIRATION RATE: 16 BRPM | SYSTOLIC BLOOD PRESSURE: 126 MMHG | HEART RATE: 87 BPM | TEMPERATURE: 97.9 F | HEIGHT: 66 IN

## 2019-08-27 DIAGNOSIS — N93.9 ABNORMAL UTERINE BLEEDING: ICD-10-CM

## 2019-08-27 DIAGNOSIS — Z01.419 WELL WOMAN EXAM WITH ROUTINE GYNECOLOGICAL EXAM: Primary | ICD-10-CM

## 2019-08-27 PROCEDURE — 99395 PREV VISIT EST AGE 18-39: CPT | Performed by: OBSTETRICS & GYNECOLOGY

## 2019-08-27 RX ORDER — MEDROXYPROGESTERONE ACETATE 10 MG/1
TABLET ORAL
Qty: 10 TABLET | Refills: 11 | Status: SHIPPED | OUTPATIENT
Start: 2019-08-27 | End: 2019-08-30 | Stop reason: SDUPTHER

## 2019-08-29 ENCOUNTER — TELEPHONE (OUTPATIENT)
Dept: FAMILY MEDICINE CLINIC | Age: 34
End: 2019-08-29

## 2019-08-30 RX ORDER — MEDROXYPROGESTERONE ACETATE 10 MG/1
TABLET ORAL
Qty: 10 TABLET | Refills: 11 | Status: SHIPPED | OUTPATIENT
Start: 2019-08-30 | End: 2021-09-28 | Stop reason: SDUPTHER

## 2019-12-03 ENCOUNTER — HOSPITAL ENCOUNTER (OUTPATIENT)
Dept: NEUROLOGY | Age: 34
Discharge: HOME | End: 2019-12-03
Payer: COMMERCIAL

## 2019-12-03 PROCEDURE — 95886 MUSC TEST DONE W/N TEST COMP: CPT

## 2019-12-03 PROCEDURE — 95864 NEEDLE EMG 4 EXTREMITIES: CPT

## 2019-12-03 PROCEDURE — 95911 NRV CNDJ TEST 9-10 STUDIES: CPT

## 2020-07-14 ENCOUNTER — HOSPITAL ENCOUNTER (OUTPATIENT)
Dept: ULTRASOUND IMAGING | Age: 35
Discharge: HOME | End: 2020-07-14
Payer: COMMERCIAL

## 2020-07-14 PROCEDURE — 76700 US EXAM ABDOM COMPLETE: CPT

## 2020-07-14 PROCEDURE — 76856 US EXAM PELVIC COMPLETE: CPT

## 2020-09-08 ENCOUNTER — OFFICE VISIT (OUTPATIENT)
Dept: GYNECOLOGY | Age: 35
End: 2020-09-08
Payer: COMMERCIAL

## 2020-09-08 VITALS
RESPIRATION RATE: 16 BRPM | WEIGHT: 268 LBS | DIASTOLIC BLOOD PRESSURE: 76 MMHG | TEMPERATURE: 97.2 F | HEART RATE: 88 BPM | OXYGEN SATURATION: 99 % | BODY MASS INDEX: 43.07 KG/M2 | HEIGHT: 66 IN | SYSTOLIC BLOOD PRESSURE: 109 MMHG

## 2020-09-08 PROCEDURE — 99395 PREV VISIT EST AGE 18-39: CPT | Performed by: OBSTETRICS & GYNECOLOGY

## 2020-09-08 RX ORDER — OMEPRAZOLE 40 MG/1
CAPSULE, DELAYED RELEASE ORAL
COMMUNITY
Start: 2020-09-06

## 2020-09-08 RX ORDER — MEDROXYPROGESTERONE ACETATE 10 MG/1
10 TABLET ORAL DAILY
Qty: 10 TABLET | Refills: 11 | Status: SHIPPED | OUTPATIENT
Start: 2020-09-08 | End: 2020-11-04

## 2020-09-08 NOTE — PROGRESS NOTES
Subjective:      Patient ID: Samuel Castro is a 28 y.o. female. HPI  pts here for annual gyn exam.  She denies complaints. Never had intercourse. Recently returned from trip to [facility]. Still having pain on right side x 5 years, constant. US normal.  Offered pt an op lap- would need pre op appt. Doing well with the cyclic provera. Review of Systems Pertinent review of systems items discussed above. All others systems items not discussed above were negative. Objective:   Physical Exam  Constitutional:       Appearance: She is well-developed. HENT:      Head: Normocephalic and atraumatic. Neck:      Thyroid: No thyromegaly. Trachea: No tracheal deviation. Cardiovascular:      Rate and Rhythm: Normal rate and regular rhythm. Heart sounds: Normal heart sounds. No murmur. Pulmonary:      Effort: Pulmonary effort is normal. No respiratory distress. Breath sounds: Normal breath sounds. No wheezing or rales. Chest:      Breasts:         Right: No mass, nipple discharge or skin change. Left: No mass, nipple discharge or skin change. Abdominal:      General: There is no distension. Palpations: Abdomen is soft. There is no mass. Tenderness: There is no abdominal tenderness. There is no rebound. Genitourinary:     Labia:         Right: No lesion. Left: No lesion. Vagina: Normal. No foreign body. No vaginal discharge. Cervix: No cervical motion tenderness, discharge or friability. Uterus: Not deviated, not enlarged, not fixed and not tender. Adnexa:         Right: No mass or tenderness. Left: No mass or tenderness. Rectum: Normal. No external hemorrhoid. Comments: Pap performed. Musculoskeletal: Normal range of motion. Lymphadenopathy:      Cervical: No cervical adenopathy. Neurological:      Mental Status: She is alert and oriented to person, place, and time.          Assessment:   Normal gyn exam, pelvic pain     Plan:   rx Provera. F/u annual gyn exam, sooner if needed.        Brandi Felder MD

## 2020-10-29 ENCOUNTER — TELEPHONE (OUTPATIENT)
Dept: GYNECOLOGY | Age: 35
End: 2020-10-29

## 2020-10-29 NOTE — TELEPHONE ENCOUNTER
Patient is calling to say her pharmacy is saying they don't have her medroxyPROGESTERone (PROVERA) 10 MG tablet  - we have a confirmed receipt -Receipt confirmed by pharmacy (9/8/2020  4:15 PM EDT) I did tell her to call her pharmacy.  Please advise

## 2020-10-30 ENCOUNTER — TELEPHONE (OUTPATIENT)
Dept: FAMILY MEDICINE CLINIC | Age: 35
End: 2020-10-30

## 2020-11-04 ENCOUNTER — OFFICE VISIT (OUTPATIENT)
Dept: GYNECOLOGY | Age: 35
End: 2020-11-04
Payer: COMMERCIAL

## 2020-11-04 VITALS
WEIGHT: 270 LBS | HEART RATE: 89 BPM | HEIGHT: 66 IN | TEMPERATURE: 98.6 F | SYSTOLIC BLOOD PRESSURE: 116 MMHG | DIASTOLIC BLOOD PRESSURE: 78 MMHG | BODY MASS INDEX: 43.39 KG/M2

## 2020-11-04 PROCEDURE — 99214 OFFICE O/P EST MOD 30 MIN: CPT | Performed by: OBSTETRICS & GYNECOLOGY

## 2020-11-04 PROCEDURE — G8417 CALC BMI ABV UP PARAM F/U: HCPCS | Performed by: OBSTETRICS & GYNECOLOGY

## 2020-11-04 PROCEDURE — 1036F TOBACCO NON-USER: CPT | Performed by: OBSTETRICS & GYNECOLOGY

## 2020-11-04 PROCEDURE — G8427 DOCREV CUR MEDS BY ELIG CLIN: HCPCS | Performed by: OBSTETRICS & GYNECOLOGY

## 2020-11-04 PROCEDURE — G8484 FLU IMMUNIZE NO ADMIN: HCPCS | Performed by: OBSTETRICS & GYNECOLOGY

## 2020-11-04 RX ORDER — SODIUM CHLORIDE 0.9 % (FLUSH) 0.9 %
10 SYRINGE (ML) INJECTION EVERY 12 HOURS SCHEDULED
Status: CANCELLED | OUTPATIENT
Start: 2020-11-04

## 2020-11-04 RX ORDER — SODIUM CHLORIDE 0.9 % (FLUSH) 0.9 %
10 SYRINGE (ML) INJECTION PRN
Status: CANCELLED | OUTPATIENT
Start: 2020-11-04

## 2020-11-04 RX ORDER — SODIUM CHLORIDE, SODIUM LACTATE, POTASSIUM CHLORIDE, CALCIUM CHLORIDE 600; 310; 30; 20 MG/100ML; MG/100ML; MG/100ML; MG/100ML
INJECTION, SOLUTION INTRAVENOUS CONTINUOUS
Status: CANCELLED | OUTPATIENT
Start: 2020-11-04

## 2020-11-04 NOTE — H&P
HISTORY AND PHYSICAL             Date: 11/4/2020        Patient Name: Yolis Hernandez     YOB: 1985      Age:  28 y.o. Chief Complaint     Chief Complaint   Patient presents with    Pelvic Pain     Right side. Pt is here to discuss surgery. LMP 10/14/2020. last pap 9/8/20      pelvic pain    History Obtained From   patient    History of Present Illness   G0 presents with a 5 year h/o right sided pain, constant, no help with otc pain meds. US normal.  Worse with position changes. Menses q month, lasting 4-5 days. Past Medical History     Past Medical History:   Diagnosis Date    ADHD (attention deficit hyperactivity disorder)     Anxiety     Auditory processing disorder     Blood disorder     Depression     Hypothyroidism     hypothyroid    IBS (irritable bowel syndrome)     ITP (idiopathic thrombocytopenic purpura)     S/P splenectomy         Past Surgical History     Past Surgical History:   Procedure Laterality Date    BONE MARROW BIOPSY  2004    CHOLECYSTECTOMY      LIPOMA RESECTION  04/13/2018    right groin exploration lipoma removal    SPLENECTOMY  2007        Medications Prior to Admission     Prior to Admission medications    Medication Sig Start Date End Date Taking? Authorizing Provider   omeprazole (PRILOSEC) 40 MG delayed release capsule  9/6/20  Yes Historical Provider, MD   medroxyPROGESTERone (PROVERA) 10 MG tablet TAKE ONE TABLET BY MOUTH DAILY FOR THE FIRST 10 DAYS ON THE MONTH 8/30/19  Yes Gerardo Roberts MD   Multiple Vitamins-Minerals (ADULT ONE DAILY GUMMIES) CHEW Take 1 tablet by mouth daily   Yes Historical Provider, MD   levothyroxine (SYNTHROID) 100 MCG tablet Take 100 mcg by mouth daily   Yes Historical Provider, MD   penicillin v potassium (VEETID) 250 MG tablet Take 250 mg by mouth every 12 hours.    Yes Historical Provider, MD   STRATTERA 80 MG capsule TAKE ONE CAPSULE BY MOUTH DAILY 12/18/16  Yes Xiomara Parikh MD   venlafaxine (EFFEXOR XR) 150 MG extended release capsule Take 1 capsule by mouth daily (with breakfast)  Patient taking differently: Take 150 mg by mouth 2 times daily  10/7/16  Yes Mattie Sandoval MD   polyethylene glycol (GLYCOLAX) powder Take 17 g by mouth nightly   Yes Historical Provider, MD        Allergies   Immune globulin; Immune globulins; Rho (d) immune globulin; and Winrho sdf [rho d immune globulin]    Social History     Social History     Tobacco History     Smoking Status  Never Smoker    Smokeless Tobacco Use  Never Used          Alcohol History     Alcohol Use Status  No          Drug Use     Drug Use Status  No          Sexual Activity     Sexually Active  Not Currently                Family History     Family History   Problem Relation Age of Onset    Diabetes Mother     Hypertension Mother     Irritable Bowel Syndrome Mother     Cancer Father         leukemia    Hypertension Father     Diabetes Brother        Review of Systems   Review of Systems    Pertinent review of systems items discussed above. All others systems items not discussed above were negative. Physical Exam   /78 (Site: Left Upper Arm, Position: Sitting)   Pulse 89   Temp 98.6 °F (37 °C)   Ht 5' 6\" (1.676 m)   Wt 270 lb (122.5 kg)   BMI 43.58 kg/m²     Physical Exam      CV- RRR  Resp- sotero CTA  Labs    No results found for this or any previous visit (from the past 24 hour(s)). Imaging/Diagnostics Last 24 Hours   No results found. Assessment    right sided pelvic pain, h/o ITP    Plan   1. Offered op lap. I discussed the technique, recovery, and risks with patient. They include but are not limited to bleeding, infection, damage to internal organs (e.g., bladder, bowel, ureters). Patient voiced understanding and agrees to proceed. 2. Will add iv pre op abx  3. 30 min >50% of time was spent discussing findings, management, and treatment options.   4.     Consultations Ordered:  None    Electronically signed by Rj Cole MD

## 2020-11-27 ENCOUNTER — OFFICE VISIT (OUTPATIENT)
Dept: PRIMARY CARE CLINIC | Age: 35
End: 2020-11-27
Payer: COMMERCIAL

## 2020-11-27 PROCEDURE — 99211 OFF/OP EST MAY X REQ PHY/QHP: CPT | Performed by: NURSE PRACTITIONER

## 2020-11-27 PROCEDURE — G8417 CALC BMI ABV UP PARAM F/U: HCPCS | Performed by: NURSE PRACTITIONER

## 2020-11-27 PROCEDURE — G8428 CUR MEDS NOT DOCUMENT: HCPCS | Performed by: NURSE PRACTITIONER

## 2020-11-27 NOTE — PROGRESS NOTES
Patient presented to [facility] drive up clinic for preop testing. Patient was swabbed and given information advising them to remain isolated until procedure date.

## 2020-11-28 LAB — SARS-COV-2: DETECTED

## 2020-11-30 ENCOUNTER — TELEPHONE (OUTPATIENT)
Dept: FAMILY MEDICINE CLINIC | Age: 35
End: 2020-11-30

## 2020-11-30 NOTE — TELEPHONE ENCOUNTER
Pt and doc aware pt was told to quarantine and read note pt was told  to call back to schedule at later time when symptoms disappear.

## 2020-12-02 ENCOUNTER — TELEPHONE (OUTPATIENT)
Dept: FAMILY MEDICINE CLINIC | Age: 35
End: 2020-12-02

## 2020-12-02 NOTE — TELEPHONE ENCOUNTER
Patient is calling to reschedule her surgery that was scheduled for tomorrow she tested positive for covid please advise

## 2020-12-21 ENCOUNTER — TELEPHONE (OUTPATIENT)
Dept: FAMILY MEDICINE CLINIC | Age: 35
End: 2020-12-21

## 2020-12-21 NOTE — TELEPHONE ENCOUNTER
Patient is calling requesting a call back to reschedule her surgery with  can you please call her back thanks

## 2020-12-22 NOTE — TELEPHONE ENCOUNTER
Called pt and scheduled for pre op on 12/30 and on 12/31 covid testing , surgery we are looking at 01/07/21

## 2020-12-30 ENCOUNTER — OFFICE VISIT (OUTPATIENT)
Dept: GYNECOLOGY | Age: 35
End: 2020-12-30
Payer: COMMERCIAL

## 2020-12-30 VITALS
BODY MASS INDEX: 42.52 KG/M2 | SYSTOLIC BLOOD PRESSURE: 126 MMHG | RESPIRATION RATE: 16 BRPM | HEART RATE: 88 BPM | DIASTOLIC BLOOD PRESSURE: 79 MMHG | HEIGHT: 66 IN | WEIGHT: 264.6 LBS | TEMPERATURE: 97.7 F

## 2020-12-30 DIAGNOSIS — R10.2 PELVIC PAIN IN FEMALE: ICD-10-CM

## 2020-12-30 LAB
HCT VFR BLD CALC: 42.1 % (ref 36–48)
HEMOGLOBIN: 13.4 G/DL (ref 12–16)
MCH RBC QN AUTO: 27.6 PG (ref 26–34)
MCHC RBC AUTO-ENTMCNC: 31.8 G/DL (ref 31–36)
MCV RBC AUTO: 86.9 FL (ref 80–100)
PDW BLD-RTO: 15.3 % (ref 12.4–15.4)
PLATELET # BLD: 51 K/UL (ref 135–450)
PLATELET SLIDE REVIEW: ABNORMAL
PMV BLD AUTO: 12.6 FL (ref 5–10.5)
RBC # BLD: 4.85 M/UL (ref 4–5.2)
SLIDE REVIEW: ABNORMAL
WBC # BLD: 7.3 K/UL (ref 4–11)

## 2020-12-30 PROCEDURE — 99214 OFFICE O/P EST MOD 30 MIN: CPT | Performed by: OBSTETRICS & GYNECOLOGY

## 2020-12-30 PROCEDURE — G8484 FLU IMMUNIZE NO ADMIN: HCPCS | Performed by: OBSTETRICS & GYNECOLOGY

## 2020-12-30 PROCEDURE — G8427 DOCREV CUR MEDS BY ELIG CLIN: HCPCS | Performed by: OBSTETRICS & GYNECOLOGY

## 2020-12-30 PROCEDURE — G8417 CALC BMI ABV UP PARAM F/U: HCPCS | Performed by: OBSTETRICS & GYNECOLOGY

## 2020-12-30 PROCEDURE — 1036F TOBACCO NON-USER: CPT | Performed by: OBSTETRICS & GYNECOLOGY

## 2020-12-30 RX ORDER — SODIUM CHLORIDE, SODIUM LACTATE, POTASSIUM CHLORIDE, CALCIUM CHLORIDE 600; 310; 30; 20 MG/100ML; MG/100ML; MG/100ML; MG/100ML
INJECTION, SOLUTION INTRAVENOUS CONTINUOUS
Status: CANCELLED | OUTPATIENT
Start: 2020-12-30

## 2020-12-30 RX ORDER — SODIUM CHLORIDE 0.9 % (FLUSH) 0.9 %
10 SYRINGE (ML) INJECTION PRN
Status: CANCELLED | OUTPATIENT
Start: 2020-12-30

## 2020-12-30 RX ORDER — SODIUM CHLORIDE 0.9 % (FLUSH) 0.9 %
10 SYRINGE (ML) INJECTION EVERY 12 HOURS SCHEDULED
Status: CANCELLED | OUTPATIENT
Start: 2020-12-30

## 2020-12-30 NOTE — PROGRESS NOTES
HISTORY AND PHYSICAL             Date: 11/4/2020        Patient Name: Idris Vargas     YOB: 1985      Age:  28 y. o.     Chief Complaint           Chief Complaint   Patient presents with    Pelvic Pain       Right side. Pt is here to discuss surgery. LMP 10/14/2020. last pap 9/8/20      pelvic pain     History Obtained From   patient     History of Present Illness   G0 presents with a 5 year h/o right sided pain, constant, no help with otc pain meds. US normal.  Worse with position changes. Menses q month, lasting 4-5 days.     Past Medical History      Past Medical History        Past Medical History:   Diagnosis Date    ADHD (attention deficit hyperactivity disorder)      Anxiety      Auditory processing disorder      Blood disorder      Depression      Hypothyroidism       hypothyroid    IBS (irritable bowel syndrome)      ITP (idiopathic thrombocytopenic purpura)      S/P splenectomy              Past Surgical History      Past Surgical History         Past Surgical History:   Procedure Laterality Date    BONE MARROW BIOPSY   2004    CHOLECYSTECTOMY        LIPOMA RESECTION   04/13/2018     right groin exploration lipoma removal    SPLENECTOMY   2007            Medications Prior to Admission      Home Medications           Prior to Admission medications    Medication Sig Start Date End Date Taking?  Authorizing Provider   omeprazole (PRILOSEC) 40 MG delayed release capsule   9/6/20   Yes Historical Provider, MD   medroxyPROGESTERone (PROVERA) 10 MG tablet TAKE ONE TABLET BY MOUTH DAILY FOR THE FIRST 10 DAYS ON THE MONTH 8/30/19   Yes Zahida Shi MD   Multiple Vitamins-Minerals (ADULT ONE DAILY GUMMIES) CHEW Take 1 tablet by mouth daily     Yes Historical Provider, MD   levothyroxine (SYNTHROID) 100 MCG tablet Take 100 mcg by mouth daily     Yes Historical Provider, MD   penicillin v potassium (VEETID) 250 MG tablet Take 250 mg by mouth every 12 hours.     Yes Historical Provider, MD   STRATTERA 80 MG capsule TAKE ONE CAPSULE BY MOUTH DAILY 12/18/16   Yes Tye Roque MD   venlafaxine (EFFEXOR XR) 150 MG extended release capsule Take 1 capsule by mouth daily (with breakfast)  Patient taking differently: Take 150 mg by mouth 2 times daily  10/7/16   Yes Tye Roque MD   polyethylene glycol (GLYCOLAX) powder Take 17 g by mouth nightly     Yes Historical Provider, MD            Allergies   Immune globulin; Immune globulins; Rho (d) immune globulin; and Winrho sdf [rho d immune globulin]     Social History          Social History           Tobacco History           Smoking Status  Never Smoker           Smokeless Tobacco Use  Never Used                  Alcohol History           Alcohol Use Status  No                  Drug Use           Drug Use Status  No                  Sexual Activity           Sexually Active  Not Currently                     Family History      Family History         Family History   Problem Relation Age of Onset    Diabetes Mother      Hypertension Mother      Irritable Bowel Syndrome Mother      Cancer Father           leukemia    Hypertension Father      Diabetes Brother              Review of Systems   Review of Systems     Pertinent review of systems items discussed above. All others systems items not discussed above were negative.     Physical Exam   /78 (Site: Left Upper Arm, Position: Sitting)   Pulse 89   Temp 98.6 °F (37 °C)   Ht 5' 6\" (1.676 m)   Wt 270 lb (122.5 kg)   BMI 43.58 kg/m²      Physical Exam        CV- RRR  Resp- sotero CTA  Labs    Recent Results   No results found for this or any previous visit (from the past 24 hour(s)).    Imaging/Diagnostics Last 24 Hours   No results found.     Assessment    right sided pelvic pain, h/o ITP     Plan   1. Offered op lap. I discussed the technique, recovery, and risks with patient.   They include but are not limited to bleeding, infection, damage to internal organs (e.g., bladder, bowel, ureters). Patient voiced understanding and agrees to proceed. 2. Will add iv pre op abx  3. 30 min >50% of time was spent discussing findings, management, and treatment options.   4.

## 2020-12-30 NOTE — H&P
Faraz Huizar is a 28 y.o. female patient. No diagnosis found. Past Medical History:   Diagnosis Date    ADHD (attention deficit hyperactivity disorder)     Anxiety     Auditory processing disorder     Blood disorder     Depression     Hypothyroidism     hypothyroid    IBS (irritable bowel syndrome)     ITP (idiopathic thrombocytopenic purpura)     S/P splenectomy      OB History    No obstetric history on file. Unknown  Estimated Date of Delivery: None noted. Allergies   Allergen Reactions    Immune Globulin Other (See Comments) and Anaphylaxis     Shock    Immune Globulins Shortness Of Breath     IVIG low blood pressure    Rho (D) Immune Globulin Anaphylaxis and Other (See Comments)     Shock    Winrho Sdf [Rho D Immune Globulin] Anaphylaxis     Low blood pressure     Active Problems:    * No active hospital problems. *  Resolved Problems:    * No resolved hospital problems. *    Blood pressure 126/79, pulse 88, temperature 97.7 °F (36.5 °C), temperature source Temporal, resp. rate 16, height 5' 6\" (1.676 m), weight 264 lb 9.6 oz (120 kg), last menstrual period 12/15/2020, not currently breastfeeding.     History  Exam  Assessment & Plan    Stanislav Matos MD  32/01/9169

## 2021-01-04 ENCOUNTER — OFFICE VISIT (OUTPATIENT)
Dept: PRIMARY CARE CLINIC | Age: 36
End: 2021-01-04
Payer: COMMERCIAL

## 2021-01-04 DIAGNOSIS — Z01.812 PRE-PROCEDURAL LABORATORY EXAMINATIONS: Primary | ICD-10-CM

## 2021-01-04 PROCEDURE — G8428 CUR MEDS NOT DOCUMENT: HCPCS | Performed by: NURSE PRACTITIONER

## 2021-01-04 PROCEDURE — G8417 CALC BMI ABV UP PARAM F/U: HCPCS | Performed by: NURSE PRACTITIONER

## 2021-01-04 PROCEDURE — 99211 OFF/OP EST MAY X REQ PHY/QHP: CPT | Performed by: NURSE PRACTITIONER

## 2021-01-04 NOTE — PROGRESS NOTES
Patient presented to [facility] drive up clinic for preop testing. Patient was swabbed and given information advising them to remain isolated until procedure date.

## 2021-01-05 ENCOUNTER — ANESTHESIA EVENT (OUTPATIENT)
Dept: OPERATING ROOM | Age: 36
End: 2021-01-05
Payer: COMMERCIAL

## 2021-01-05 NOTE — PROGRESS NOTES
C-Difficile admission screening and protocol:     * Admitted with diarrhea?no     *Prior history of C-Diff. In last 3 months?no     *Antibiotic use in the past 6-8 weeks?yes     *Prior hospitalization or nursing home in the last month?  no    Preoperative Screening for Elective Surgery/Invasive Procedures While COVID-19 present in the community     Have you tested positive or have been told to self-isolate for COVID-19 like symptoms within the past 28 days? no   Do you currently have any of the following symptoms?no  o Fever >100.0 F or 99.9 F in immunocompromised patients? o New onset cough, shortness of breath or difficulty breathing?  o New onset sore throat, myalgia (muscle aches and pains), headache, loss of taste/smell or diarrhea?  Have you had a potential exposure to COVID-19 within the past 14 days by:  o Close contact with a confirmed case?no  o Close contact with a healthcare worker,  or essential infrastructure worker (grocery store, [employer], gas station, public utilities or transportation)?no  o Do you reside in a congregate setting such as; skilled nursing facility, adult home, correctional facility, homeless shelter or other institutional setting?no  o Have you had recent travel to a known COVID-19 hotspot? no    Indicate if the patient has a positive screen by answering yes to one or more of the above questions. Patients who test positive or screen positive prior to surgery or on the day of surgery should be evaluated in conjunction with the surgeon/proceduralist/anesthesiologist to determine the urgency of the procedure. Remember. Lesa Yun Safety First! Call before you [facility] time__0600__________        Surgery time___730_________    Take the following medications with a sip of water: Follow your MD/Surgeons pre-procedure instructions regarding your medications    Do not eat or drink anything after 12:00 midnight prior to your surgery. This includes water chewing gum, mints and ice chips. You may brush your teeth and gargle the morning of your surgery, but do not swallow the water     Please see your family doctor/pediatrician for a history and physical and/or concerning medications. Bring any test results/reports from your physicians office. If you are under the care of a heart doctor or specialist doctor, please be aware that you may be asked to them for clearance    You may be asked to stop blood thinners such as Coumadin, Plavix, Fragmin, Lovenox, etc., or any anti-inflammatories such as:  Aspirin, Ibuprofen, Advil, Naproxen prior to your surgery. We also ask that you stop any OTC medications such as fish oil, vitamin E, glucosamine, garlic, Multivitamins, COQ 10, etc.    We ask that you do not smoke 24 hours prior to surgery  We ask that you do not  drink any alcoholic beverages 24 hours prior to surgery     You must make arrangements for a responsible adult to take you home after your surgery. For your safety you will not be allowed to leave alone or drive yourself home. Your surgery will be cancelled if you do not have a ride home. Also for your safety, it is strongly suggested that someone stay with you the first 24 hours after your surgery. A parent or legal guardian must accompany a child scheduled for surgery and plan to stay at the hospital until the child is discharged. Please do not bring other children with you. For your comfort, please wear simple loose fitting clothing to the hospital.  Please do not bring valuables. Do not wear any make-up or nail polish on your fingers or toes      For your safety, please do not wear any jewelry or body piercing's on the day of surgery. All jewelry must be removed. If you have dentures, they will be removed before going to operating room. For your convenience, we will provide you with a container.     If you wear contact lenses or glasses, they will be removed, please bring a case for them. If you have a living will and a durable power of  for healthcare, please bring in a copy. As part of our patient safety program to minimize surgical site infections, we ask you to do the following:    · Please notify your surgeon if you develop any illness between         now and the  day of your surgery. · This includes a cough, cold, fever, sore throat, nausea,         or vomiting, and diarrhea, etc.  ·  Please notify your surgeon if you experience dizziness, shortness         of breath or blurred vision between now and the time of your surgery. Do not shave your operative site 96 hours prior to surgery. For face and neck surgery, men may use an electric razor 48 hours   prior to surgery. You may shower the night before surgery or the morning of   your surgery with an antibacterial soap. You will need to bring a photo ID and insurance card    [facility] has an onsite pharmacy, would you like to utilize our pharmacy     If you will be staying overnight and use a C-pap machine, please bring   your C-pap to hospital     Our goal is to provide you with excellent care, therefore, visitors will be limited to two(2) in the room at a time so that we may focus on providing this care for you. Please contact pre-admission testing if you have any further questions. [facility] phone number:  [phone] PAT fax number:  [phone]  Please note these are generalized instructions for all surgical cases, you may be provided with more specific instructions according to your surgery.

## 2021-01-06 LAB — SARS-COV-2, NAA: NOT DETECTED

## 2021-01-07 ENCOUNTER — ANESTHESIA (OUTPATIENT)
Dept: OPERATING ROOM | Age: 36
End: 2021-01-07
Payer: COMMERCIAL

## 2021-01-07 ENCOUNTER — HOSPITAL ENCOUNTER (OUTPATIENT)
Age: 36
Setting detail: OUTPATIENT SURGERY
Discharge: HOME | End: 2021-01-07
Attending: OBSTETRICS & GYNECOLOGY | Admitting: OBSTETRICS & GYNECOLOGY
Payer: COMMERCIAL

## 2021-01-07 VITALS
WEIGHT: 264 LBS | HEART RATE: 99 BPM | TEMPERATURE: 97 F | DIASTOLIC BLOOD PRESSURE: 78 MMHG | BODY MASS INDEX: 42.43 KG/M2 | SYSTOLIC BLOOD PRESSURE: 126 MMHG | HEIGHT: 66 IN | RESPIRATION RATE: 16 BRPM | OXYGEN SATURATION: 95 %

## 2021-01-07 VITALS
RESPIRATION RATE: 8 BRPM | DIASTOLIC BLOOD PRESSURE: 59 MMHG | OXYGEN SATURATION: 98 % | SYSTOLIC BLOOD PRESSURE: 104 MMHG

## 2021-01-07 DIAGNOSIS — R10.2 PELVIC PAIN IN FEMALE: Primary | ICD-10-CM

## 2021-01-07 LAB
ABO/RH: NORMAL
ANTIBODY SCREEN: NORMAL
PLATELET # BLD: 63 K/UL (ref 135–450)
PREGNANCY, URINE: NEGATIVE

## 2021-01-07 PROCEDURE — 85049 AUTOMATED PLATELET COUNT: CPT

## 2021-01-07 PROCEDURE — 6360000002 HC RX W HCPCS: Performed by: ANESTHESIOLOGY

## 2021-01-07 PROCEDURE — 2709999900 HC NON-CHARGEABLE SUPPLY: Performed by: OBSTETRICS & GYNECOLOGY

## 2021-01-07 PROCEDURE — 2580000003 HC RX 258: Performed by: ANESTHESIOLOGY

## 2021-01-07 PROCEDURE — 3700000001 HC ADD 15 MINUTES (ANESTHESIA): Performed by: OBSTETRICS & GYNECOLOGY

## 2021-01-07 PROCEDURE — 7100000011 HC PHASE II RECOVERY - ADDTL 15 MIN: Performed by: OBSTETRICS & GYNECOLOGY

## 2021-01-07 PROCEDURE — 3700000000 HC ANESTHESIA ATTENDED CARE: Performed by: OBSTETRICS & GYNECOLOGY

## 2021-01-07 PROCEDURE — 7100000001 HC PACU RECOVERY - ADDTL 15 MIN: Performed by: OBSTETRICS & GYNECOLOGY

## 2021-01-07 PROCEDURE — 3600000004 HC SURGERY LEVEL 4 BASE: Performed by: OBSTETRICS & GYNECOLOGY

## 2021-01-07 PROCEDURE — 84703 CHORIONIC GONADOTROPIN ASSAY: CPT

## 2021-01-07 PROCEDURE — 6370000000 HC RX 637 (ALT 250 FOR IP): Performed by: ANESTHESIOLOGY

## 2021-01-07 PROCEDURE — 6360000002 HC RX W HCPCS: Performed by: NURSE ANESTHETIST, CERTIFIED REGISTERED

## 2021-01-07 PROCEDURE — 86901 BLOOD TYPING SEROLOGIC RH(D): CPT

## 2021-01-07 PROCEDURE — 58660 LAPAROSCOPY LYSIS: CPT | Performed by: OBSTETRICS & GYNECOLOGY

## 2021-01-07 PROCEDURE — 86850 RBC ANTIBODY SCREEN: CPT

## 2021-01-07 PROCEDURE — 3600000014 HC SURGERY LEVEL 4 ADDTL 15MIN: Performed by: OBSTETRICS & GYNECOLOGY

## 2021-01-07 PROCEDURE — 7100000010 HC PHASE II RECOVERY - FIRST 15 MIN: Performed by: OBSTETRICS & GYNECOLOGY

## 2021-01-07 PROCEDURE — 6360000002 HC RX W HCPCS: Performed by: OBSTETRICS & GYNECOLOGY

## 2021-01-07 PROCEDURE — 2500000003 HC RX 250 WO HCPCS: Performed by: NURSE ANESTHETIST, CERTIFIED REGISTERED

## 2021-01-07 PROCEDURE — 7100000000 HC PACU RECOVERY - FIRST 15 MIN: Performed by: OBSTETRICS & GYNECOLOGY

## 2021-01-07 PROCEDURE — 86900 BLOOD TYPING SEROLOGIC ABO: CPT

## 2021-01-07 RX ORDER — MORPHINE SULFATE 2 MG/ML
2 INJECTION, SOLUTION INTRAMUSCULAR; INTRAVENOUS EVERY 5 MIN PRN
Status: DISCONTINUED | OUTPATIENT
Start: 2021-01-07 | End: 2021-01-07 | Stop reason: HOSPADM

## 2021-01-07 RX ORDER — SUCCINYLCHOLINE/SOD CL,ISO/PF 200MG/10ML
SYRINGE (ML) INTRAVENOUS PRN
Status: DISCONTINUED | OUTPATIENT
Start: 2021-01-07 | End: 2021-01-07 | Stop reason: SDUPTHER

## 2021-01-07 RX ORDER — FENTANYL CITRATE 50 UG/ML
50 INJECTION, SOLUTION INTRAMUSCULAR; INTRAVENOUS EVERY 5 MIN PRN
Status: DISCONTINUED | OUTPATIENT
Start: 2021-01-07 | End: 2021-01-07 | Stop reason: HOSPADM

## 2021-01-07 RX ORDER — KETOROLAC TROMETHAMINE 30 MG/ML
INJECTION, SOLUTION INTRAMUSCULAR; INTRAVENOUS PRN
Status: DISCONTINUED | OUTPATIENT
Start: 2021-01-07 | End: 2021-01-07 | Stop reason: SDUPTHER

## 2021-01-07 RX ORDER — PROPOFOL 10 MG/ML
INJECTION, EMULSION INTRAVENOUS PRN
Status: DISCONTINUED | OUTPATIENT
Start: 2021-01-07 | End: 2021-01-07 | Stop reason: SDUPTHER

## 2021-01-07 RX ORDER — SODIUM CHLORIDE 0.9 % (FLUSH) 0.9 %
10 SYRINGE (ML) INJECTION EVERY 12 HOURS SCHEDULED
Status: DISCONTINUED | OUTPATIENT
Start: 2021-01-07 | End: 2021-01-07 | Stop reason: HOSPADM

## 2021-01-07 RX ORDER — ONDANSETRON 2 MG/ML
4 INJECTION INTRAMUSCULAR; INTRAVENOUS
Status: DISCONTINUED | OUTPATIENT
Start: 2021-01-07 | End: 2021-01-07 | Stop reason: HOSPADM

## 2021-01-07 RX ORDER — SODIUM CHLORIDE 9 MG/ML
INJECTION, SOLUTION INTRAVENOUS CONTINUOUS
Status: DISCONTINUED | OUTPATIENT
Start: 2021-01-07 | End: 2021-01-07 | Stop reason: HOSPADM

## 2021-01-07 RX ORDER — OXYCODONE HYDROCHLORIDE AND ACETAMINOPHEN 5; 325 MG/1; MG/1
1 TABLET ORAL EVERY 6 HOURS PRN
Qty: 28 TABLET | Refills: 0 | Status: SHIPPED | OUTPATIENT
Start: 2021-01-07 | End: 2021-01-14

## 2021-01-07 RX ORDER — MEPERIDINE HYDROCHLORIDE 25 MG/ML
12.5 INJECTION INTRAMUSCULAR; INTRAVENOUS; SUBCUTANEOUS EVERY 5 MIN PRN
Status: DISCONTINUED | OUTPATIENT
Start: 2021-01-07 | End: 2021-01-07 | Stop reason: HOSPADM

## 2021-01-07 RX ORDER — GLYCOPYRROLATE 0.2 MG/ML
INJECTION INTRAMUSCULAR; INTRAVENOUS PRN
Status: DISCONTINUED | OUTPATIENT
Start: 2021-01-07 | End: 2021-01-07 | Stop reason: SDUPTHER

## 2021-01-07 RX ORDER — MIDAZOLAM HYDROCHLORIDE 1 MG/ML
INJECTION INTRAMUSCULAR; INTRAVENOUS PRN
Status: DISCONTINUED | OUTPATIENT
Start: 2021-01-07 | End: 2021-01-07 | Stop reason: SDUPTHER

## 2021-01-07 RX ORDER — FENTANYL CITRATE 50 UG/ML
INJECTION, SOLUTION INTRAMUSCULAR; INTRAVENOUS PRN
Status: DISCONTINUED | OUTPATIENT
Start: 2021-01-07 | End: 2021-01-07 | Stop reason: SDUPTHER

## 2021-01-07 RX ORDER — MORPHINE SULFATE 2 MG/ML
1 INJECTION, SOLUTION INTRAMUSCULAR; INTRAVENOUS EVERY 5 MIN PRN
Status: DISCONTINUED | OUTPATIENT
Start: 2021-01-07 | End: 2021-01-07 | Stop reason: HOSPADM

## 2021-01-07 RX ORDER — SODIUM CHLORIDE 0.9 % (FLUSH) 0.9 %
10 SYRINGE (ML) INJECTION PRN
Status: DISCONTINUED | OUTPATIENT
Start: 2021-01-07 | End: 2021-01-07 | Stop reason: HOSPADM

## 2021-01-07 RX ORDER — OXYCODONE HYDROCHLORIDE 5 MG/1
5 TABLET ORAL PRN
Status: COMPLETED | OUTPATIENT
Start: 2021-01-07 | End: 2021-01-07

## 2021-01-07 RX ORDER — DEXAMETHASONE SODIUM PHOSPHATE 4 MG/ML
INJECTION, SOLUTION INTRA-ARTICULAR; INTRALESIONAL; INTRAMUSCULAR; INTRAVENOUS; SOFT TISSUE PRN
Status: DISCONTINUED | OUTPATIENT
Start: 2021-01-07 | End: 2021-01-07 | Stop reason: SDUPTHER

## 2021-01-07 RX ORDER — ONDANSETRON 2 MG/ML
INJECTION INTRAMUSCULAR; INTRAVENOUS PRN
Status: DISCONTINUED | OUTPATIENT
Start: 2021-01-07 | End: 2021-01-07 | Stop reason: SDUPTHER

## 2021-01-07 RX ORDER — LIDOCAINE HYDROCHLORIDE 20 MG/ML
INJECTION, SOLUTION EPIDURAL; INFILTRATION; INTRACAUDAL; PERINEURAL PRN
Status: DISCONTINUED | OUTPATIENT
Start: 2021-01-07 | End: 2021-01-07 | Stop reason: SDUPTHER

## 2021-01-07 RX ORDER — OXYCODONE HYDROCHLORIDE 10 MG/1
10 TABLET ORAL PRN
Status: COMPLETED | OUTPATIENT
Start: 2021-01-07 | End: 2021-01-07

## 2021-01-07 RX ORDER — FENTANYL CITRATE 50 UG/ML
25 INJECTION, SOLUTION INTRAMUSCULAR; INTRAVENOUS EVERY 5 MIN PRN
Status: DISCONTINUED | OUTPATIENT
Start: 2021-01-07 | End: 2021-01-07 | Stop reason: HOSPADM

## 2021-01-07 RX ORDER — ROCURONIUM BROMIDE 10 MG/ML
INJECTION, SOLUTION INTRAVENOUS PRN
Status: DISCONTINUED | OUTPATIENT
Start: 2021-01-07 | End: 2021-01-07 | Stop reason: SDUPTHER

## 2021-01-07 RX ADMIN — KETOROLAC TROMETHAMINE 30 MG: 30 INJECTION, SOLUTION INTRAMUSCULAR at 08:23

## 2021-01-07 RX ADMIN — FENTANYL CITRATE 50 MCG: 50 INJECTION INTRAMUSCULAR; INTRAVENOUS at 09:05

## 2021-01-07 RX ADMIN — OXYCODONE 5 MG: 5 TABLET ORAL at 12:07

## 2021-01-07 RX ADMIN — SODIUM CHLORIDE: 9 INJECTION, SOLUTION INTRAVENOUS at 06:50

## 2021-01-07 RX ADMIN — FENTANYL CITRATE 50 MCG: 50 INJECTION INTRAMUSCULAR; INTRAVENOUS at 08:04

## 2021-01-07 RX ADMIN — SODIUM CHLORIDE: 9 INJECTION, SOLUTION INTRAVENOUS at 07:29

## 2021-01-07 RX ADMIN — DEXAMETHASONE SODIUM PHOSPHATE 6 MG: 4 INJECTION, SOLUTION INTRAMUSCULAR; INTRAVENOUS at 07:37

## 2021-01-07 RX ADMIN — MIDAZOLAM 1 MG: 1 INJECTION INTRAMUSCULAR; INTRAVENOUS at 07:37

## 2021-01-07 RX ADMIN — ROCURONIUM BROMIDE 30 MG: 10 INJECTION INTRAVENOUS at 07:58

## 2021-01-07 RX ADMIN — SUGAMMADEX 100 MG: 100 INJECTION, SOLUTION INTRAVENOUS at 08:21

## 2021-01-07 RX ADMIN — FENTANYL CITRATE 50 MCG: 50 INJECTION INTRAMUSCULAR; INTRAVENOUS at 07:37

## 2021-01-07 RX ADMIN — ONDANSETRON 4 MG: 2 INJECTION INTRAMUSCULAR; INTRAVENOUS at 08:23

## 2021-01-07 RX ADMIN — MORPHINE SULFATE 2 MG: 2 INJECTION, SOLUTION INTRAMUSCULAR; INTRAVENOUS at 09:50

## 2021-01-07 RX ADMIN — SODIUM CHLORIDE: 9 INJECTION, SOLUTION INTRAVENOUS at 08:06

## 2021-01-07 RX ADMIN — LIDOCAINE HYDROCHLORIDE 5 ML: 20 INJECTION, SOLUTION EPIDURAL; INFILTRATION; INTRACAUDAL; PERINEURAL at 07:37

## 2021-01-07 RX ADMIN — Medication 2 G: at 07:29

## 2021-01-07 RX ADMIN — MIDAZOLAM 1 MG: 1 INJECTION INTRAMUSCULAR; INTRAVENOUS at 07:29

## 2021-01-07 RX ADMIN — FENTANYL CITRATE 50 MCG: 50 INJECTION INTRAMUSCULAR; INTRAVENOUS at 09:23

## 2021-01-07 RX ADMIN — PROPOFOL 200 MG: 10 INJECTION, EMULSION INTRAVENOUS at 07:37

## 2021-01-07 RX ADMIN — SUGAMMADEX 100 MG: 100 INJECTION, SOLUTION INTRAVENOUS at 08:26

## 2021-01-07 RX ADMIN — Medication 160 MG: at 07:37

## 2021-01-07 RX ADMIN — GLYCOPYRROLATE 0.1 MG: 0.2 INJECTION, SOLUTION INTRAMUSCULAR; INTRAVENOUS at 07:29

## 2021-01-07 RX ADMIN — FENTANYL CITRATE 25 MCG: 50 INJECTION INTRAMUSCULAR; INTRAVENOUS at 08:25

## 2021-01-07 RX ADMIN — FENTANYL CITRATE 50 MCG: 50 INJECTION INTRAMUSCULAR; INTRAVENOUS at 07:29

## 2021-01-07 RX ADMIN — SUGAMMADEX 100 MG: 100 INJECTION, SOLUTION INTRAVENOUS at 08:24

## 2021-01-07 RX ADMIN — FENTANYL CITRATE 25 MCG: 50 INJECTION INTRAMUSCULAR; INTRAVENOUS at 08:38

## 2021-01-07 ASSESSMENT — PULMONARY FUNCTION TESTS
PIF_VALUE: 4
PIF_VALUE: 27
PIF_VALUE: 32
PIF_VALUE: 27
PIF_VALUE: 35
PIF_VALUE: 12
PIF_VALUE: 25
PIF_VALUE: 27
PIF_VALUE: 12
PIF_VALUE: 12
PIF_VALUE: 36
PIF_VALUE: 25
PIF_VALUE: 25
PIF_VALUE: 12
PIF_VALUE: 35
PIF_VALUE: 24
PIF_VALUE: 25
PIF_VALUE: 26
PIF_VALUE: 27
PIF_VALUE: 21
PIF_VALUE: 26
PIF_VALUE: 26
PIF_VALUE: 33
PIF_VALUE: 33
PIF_VALUE: 28
PIF_VALUE: 25
PIF_VALUE: 26
PIF_VALUE: 25
PIF_VALUE: 24
PIF_VALUE: 4
PIF_VALUE: 34
PIF_VALUE: 35

## 2021-01-07 ASSESSMENT — PAIN SCALES - GENERAL
PAINLEVEL_OUTOF10: 8
PAINLEVEL_OUTOF10: 0
PAINLEVEL_OUTOF10: 6
PAINLEVEL_OUTOF10: 8

## 2021-01-07 ASSESSMENT — PAIN DESCRIPTION - LOCATION
LOCATION: ABDOMEN

## 2021-01-07 ASSESSMENT — PAIN DESCRIPTION - ONSET
ONSET: ON-GOING
ONSET: ON-GOING

## 2021-01-07 ASSESSMENT — PAIN - FUNCTIONAL ASSESSMENT: PAIN_FUNCTIONAL_ASSESSMENT: PREVENTS OR INTERFERES WITH ALL ACTIVE AND SOME PASSIVE ACTIVITIES

## 2021-01-07 ASSESSMENT — PAIN DESCRIPTION - PROGRESSION
CLINICAL_PROGRESSION: GRADUALLY IMPROVING
CLINICAL_PROGRESSION: NOT CHANGED
CLINICAL_PROGRESSION: GRADUALLY WORSENING

## 2021-01-07 ASSESSMENT — PAIN DESCRIPTION - ORIENTATION
ORIENTATION: MID
ORIENTATION: MID

## 2021-01-07 ASSESSMENT — PAIN DESCRIPTION - FREQUENCY
FREQUENCY: CONTINUOUS
FREQUENCY: CONTINUOUS

## 2021-01-07 ASSESSMENT — PAIN DESCRIPTION - PAIN TYPE
TYPE: SURGICAL PAIN
TYPE: SURGICAL PAIN

## 2021-01-07 ASSESSMENT — PAIN DESCRIPTION - DESCRIPTORS
DESCRIPTORS: DISCOMFORT;CRAMPING
DESCRIPTORS: DISCOMFORT;CRAMPING
DESCRIPTORS: CRAMPING

## 2021-01-07 NOTE — OP NOTE
[address] [facility]                                OPERATIVE REPORT    PATIENT NAME: Catherine Narayan                        :        1985  MED REC NO:   [id number]                          ROOM:  ACCOUNT NO:   [de-identified]                           ADMIT DATE: 2021  PROVIDER:     Marilu Talamantes. MD Karen    DATE OF PROCEDURE:  2021    PREOPERATIVE DIAGNOSIS:  Pelvic pain. POSTOPERATIVE DIAGNOSIS:  Pelvic pain. OPERATION PERFORMED:  Operative laparoscopy, lysis of adhesions. SURGEON:  Vic Dowd MD    ANESTHESIA:  General endotracheal anesthetic. ESTIMATED BLOOD LOSS:  Less than 50 mL. PATHOLOGY:  None. DRAINS:  None. COMPLICATIONS:  None. DISPOSITION:  Stable to recovery room. INDICATIONS:  The patient is a 80-year-old female. She is  0. She has a 5-year history of right-sided pain. She states it is  constant. There is no help with over-the-counter pain medicines. Ultrasound was normal.  She states it is worse with position changes. She has periods every month lasting 4 to 5 days. Her medical history is  significant for ITP. She has a history of splenectomy. I offered her  the option of an operative laparoscopy. I discussed the technique, the  recovery, and the risks. They include, but are not limited to,  bleeding, infection, damage to her internal organs such as bladder and  bowels and ureters with need for subsequent reparative surgery if damage  occurred. The patient voiced understanding and agreed to proceed with  the surgery. The patient would require IV preoperative antibiotics. OPERATIVE PROCEDURE:  The patient was taken to the operating room and  was prepped and draped in normal sterile fashion in the dorsal lithotomy  position. A sponge stick was placed in the patient's vagina. Attention  was turned to the patient's abdomen.   A 5-mm infraumbilical incision was  made with a scalpel. The Optiview trocar along with the camera then  were placed visually confirming intra-abdominal presence. Carbon  dioxide gas created pneumoperitoneum. Pressures never greater than 15  mmHg, approximately 3 L of CO2 were used. A second incision was made. This was three fingerbreadths superior to  the pubic symphysis, it was 5 mm in size, and a trocar was placed  through here under direct visualization. Upon entrance into the  abdomen, I could see there were some areas where the bowel was seen  gassy, and so, visualization was limited. So, I placed a third  incision. This was 8 cm from the midline. It was over on the right  side, it was 5 mm in size, and a trocar was placed through here under  direct visualization. At this point, I was able to see the anterior  cul-de-sac. This was grossly free of disease. There were some small  adhesions over on the left pelvic sidewall. These were taken down using  sharp dissection. The left ovary and the left tube then were visualized. They were within  normal limits. The left ovarian fossa was within normal limits. The  two extra ports, the right port and the suprapubic port, were needed in  order to help use blunt-tipped probes to move the bowel out of the way. The posterior cul-de-sac was grossly free of disease. The right tube  and the right ovary were within normal limits. The right ovarian fossa  was within normal limits. The remainder of the abdominal and pelvic  survey which was performed was within normal limits. In other words, I  was unable to find a GYN cause for the right-sided pelvic pain that the  patient was having. At this point, the carbon dioxide gas allowed to completely escape from  the patient's abdomen. After which time, the trocars were removed under  direct visualization. The incisions were reapproximated using  subcuticular stitches of 4-0 Vicryl.   Pressure dressings placed across  the incisions. The vaginal instruments were removed as well. She was  taken out of lithotomy. She was allowed to awaken from anesthesia. After which time, she was transferred from the operating room to the  recovery room where she went in stable condition. All sponge, lap and  needles were correct x2. Preoperatively, the patient did have a platelet count and her platelet  count was 63.         Ruperto Martini MD    D: 07/85/3673 8:32:45       T: 01/07/2021 8:41:09     [email or web address]  Job#: [id number]     Doc#: [id number]    CC:  Reza Baron MD

## 2021-01-07 NOTE — PROGRESS NOTES
Pt sitting up in chair. Tolerating po. Discharge instructions given to patient and her Dad. IV d/c'd.

## 2021-01-07 NOTE — ANESTHESIA PRE PROCEDURE
Department of Anesthesiology  Preprocedure Note       Name:  Jerry Arechiga   Age:  28 y.o.  :  1985                                          MRN:  [id number]         Date:  2021      Surgeon: Sai Bryant):  Mattie Ibarra MD    Procedure: Procedure(s):  OPERATIVE LAPAROSCOPY    Medications prior to admission:   Prior to Admission medications    Medication Sig Start Date End Date Taking? Authorizing Provider   omeprazole (PRILOSEC) 40 MG delayed release capsule  20  Yes Historical Provider, MD   medroxyPROGESTERone (PROVERA) 10 MG tablet TAKE ONE TABLET BY MOUTH DAILY FOR THE FIRST 10 DAYS ON THE MONTH 19  Yes Mattie Ibarra MD   Multiple Vitamins-Minerals (ADULT ONE DAILY GUMMIES) CHEW Take 1 tablet by mouth daily   Yes Historical Provider, MD   levothyroxine (SYNTHROID) 100 MCG tablet Take 100 mcg by mouth daily   Yes Historical Provider, MD   penicillin v potassium (VEETID) 250 MG tablet Take 250 mg by mouth every 12 hours.    Yes Historical Provider, MD   STRATTERA 80 MG capsule TAKE ONE CAPSULE BY MOUTH DAILY 16  Yes Chun Baca MD   venlafaxine (EFFEXOR XR) 150 MG extended release capsule Take 1 capsule by mouth daily (with breakfast)  Patient taking differently: Take 150 mg by mouth 2 times daily  10/7/16  Yes Chun Baca MD   polyethylene glycol (GLYCOLAX) powder Take 17 g by mouth nightly   Yes Historical Provider, MD       Current medications:    Current Facility-Administered Medications   Medication Dose Route Frequency Provider Last Rate Last Admin    0.9 % sodium chloride infusion   Intravenous Continuous Rufus Patel MD        sodium chloride flush 0.9 % injection 10 mL  10 mL Intravenous 2 times per day Rufus Patel MD        sodium chloride flush 0.9 % injection 10 mL  10 mL Intravenous PRN Rufus Patel MD        ceFAZolin (ANCEF) 3 g in dextrose 5 % 100 mL IVPB  3 g Intravenous On Call to  [facility] MD Ena           Allergies: Allergies   Allergen Reactions    Immune Globulin Other (See Comments) and Anaphylaxis     Shock    Immune Globulins Shortness Of Breath     IVIG low blood pressure    Rho (D) Immune Globulin Anaphylaxis and Other (See Comments)     Shock    Winrho Sdf [Rho D Immune Globulin] Anaphylaxis     Low blood pressure       Problem List:    Patient Active Problem List   Diagnosis Code    ADHD (attention deficit hyperactivity disorder) F90.9    S/P splenectomy Z90.81    Auditory processing disorder H93.25    Right low back pain M54.5    Idiopathic thrombocytopenic purpura (HCC) D69.3    Closed nondisplaced fracture of lateral malleolus of right fibula S82.64XA    Contusion of left foot S90.32XA    Inability to ambulate due to ankle or foot R26.2    Closed nondisplaced fracture of fourth metatarsal bone of right foot S92.344A    Closed nondisplaced fracture of lateral malleolus of left fibula S82.65XA    Closed nondisplaced fracture of second metatarsal bone of right foot S92.324A    Lipoma of torso D17.1       Past Medical History:        Diagnosis Date    ADHD (attention deficit hyperactivity disorder)     Anxiety     Auditory processing disorder     Blood disorder     Depression     Hypothyroidism     hypothyroid    IBS (irritable bowel syndrome)     ITP (idiopathic thrombocytopenic purpura)     S/P splenectomy        Past Surgical History:        Procedure Laterality Date    BONE MARROW BIOPSY  2004    CHOLECYSTECTOMY      LIPOMA RESECTION  04/13/2018    right groin exploration lipoma removal    SPLENECTOMY  2007       Social History:    Social History     Tobacco Use    Smoking status: Never Smoker    Smokeless tobacco: Never Used   Substance Use Topics    Alcohol use:  No                                Counseling given: Not Answered      Vital Signs (Current):   Vitals:    01/05/21 1255   Weight: 264 lb (119.7 kg)   Height: 5' 6\" (1.676 m)                                              BP Readings from Last 3 Encounters:   12/30/20 126/79   11/04/20 116/78   09/08/20 109/76       NPO Status: Time of last liquid consumption: 2100                        Time of last solid consumption: 2100                        Date of last liquid consumption: 01/06/21                        Date of last solid food consumption: 01/06/21    BMI:   Wt Readings from Last 3 Encounters:   01/05/21 264 lb (119.7 kg)   12/30/20 264 lb 9.6 oz (120 kg)   11/04/20 270 lb (122.5 kg)     Body mass index is 42.61 kg/m². CBC:   Lab Results   Component Value Date    WBC 7.3 12/30/2020    RBC 4.85 12/30/2020    RBC 4.77 01/20/2017    HGB 13.4 12/30/2020    HCT 42.1 12/30/2020    MCV 86.9 12/30/2020    RDW 15.3 12/30/2020    PLT 51 12/30/2020       CMP:   Lab Results   Component Value Date     10/05/2017    K 4.6 10/05/2017     10/05/2017    CO2 24 10/05/2017    BUN 13 10/05/2017    CREATININE 0.8 10/05/2017    GFRAA >60 10/05/2017    AGRATIO 1.1 10/05/2017    LABGLOM >60 10/05/2017    GLUCOSE 96 10/05/2017    PROT 7.2 10/05/2017    CALCIUM 9.2 10/05/2017    BILITOT <0.2 10/05/2017    ALKPHOS 111 10/05/2017    AST 20 10/05/2017    ALT 22 10/05/2017       POC Tests: No results for input(s): POCGLU, POCNA, POCK, POCCL, POCBUN, POCHEMO, POCHCT in the last 72 hours.     Coags:   Lab Results   Component Value Date    PROTIME 12.2 06/30/2017    INR 1.08 06/30/2017    APTT 28.7 06/30/2017       HCG (If Applicable):   Lab Results   Component Value Date    PREGTESTUR Negative 04/13/2018        ABGs: No results found for: PHART, PO2ART, IPU2ITK, UGJ1SDV, BEART, R8LVGQSZ     Type & Screen (If Applicable):  No results found for: LABABO, LABRH    Drug/Infectious Status (If Applicable):  No results found for: HIV, HEPCAB    COVID-19 Screening (If Applicable):   Lab Results   Component Value Date    COVID19 NOT DETECTED 01/04/2021         Anesthesia Evaluation  Patient summary reviewed and Nursing notes reviewed no history of anesthetic complications:   Airway: Mallampati: III  TM distance: >3 FB   Neck ROM: full  Mouth opening: > = 3 FB Dental:      Comment: Chipped teeth, poor dentition    Pulmonary:Negative Pulmonary ROS                              Cardiovascular:Negative CV ROS  Exercise tolerance: good (>4 METS),           Rhythm: regular                      Neuro/Psych:   (+) psychiatric history:   (-) seizures, neuromuscular disease, TIA, CVA, headaches and depression/anxiety            GI/Hepatic/Renal:   (+) morbid obesity     (-) hiatal hernia, GERD, PUD, hepatitis, liver disease, no renal disease and bowel prep      ROS comment: IBS  . Endo/Other:    (+) hypothyroidism, blood dyscrasia (ITP)::., no malignancy/cancer. (-) hyperthyroidism, arthritis, no electrolyte abnormalities, no malignancy/cancer               Abdominal:   (+) obese,         Vascular: negative vascular ROS. Anesthesia Plan      general     ASA 3       Induction: intravenous. MIPS: Postoperative opioids intended and Prophylactic antiemetics administered. Anesthetic plan and risks discussed with patient. Use of blood products discussed with patient whom consented to blood products. Plan discussed with CRNA. Billie Patrick MD   1/7/2021    This pre-anesthesia assessment may be used as a history and physical.    DOS STAFF ADDENDUM:    Pt seen and examined, chart reviewed (including anesthesia, drug and allergy history). No interval changes to history and physical examination. Anesthetic plan, risks, benefits, alternatives, and personnel involved discussed with patient. Patient verbalized an understanding and agrees to proceed.       Billie Patrick MD  January 7, 2021  6:44 AM

## 2021-01-07 NOTE — BRIEF OP NOTE
Brief Postoperative Note      Patient: Carson Cooper  YOB: 1985  MRN: [id number]    Date of Procedure: 1/7/2021    Pre-Op Diagnosis: PELVIC PAIN    Post-Op Diagnosis: Same       Procedure(s):  OPERATIVE LAPAROSCOPY, lysis of adhesions    Surgeon(s):  Diane Box MD    Assistant:  Surgical Assistant: Dez Rojas    Anesthesia: General    Estimated Blood Loss (mL): less than 50     Complications: None    Specimens:   * No specimens in log *    Implants:  * No implants in log *      Drains: * No LDAs found *    Findings: normal anatomy, adhesions on left pelvic sidewall    Electronically signed by Jak Pinedo MD on 1/3/0349 at 8:33 AM

## 2021-01-07 NOTE — PROGRESS NOTES
Received from PACU. Admitted to Phase 2 care. Awake, respirations easy and even. Oriented to room and surroundings. Continues with complaints of pain.

## 2021-01-07 NOTE — PROGRESS NOTES
Vaginal Sweep Documentation     Intraop skin prep sponge count correct, verified by GERARDO Watson RN and ASIM CORTES. Vaginal sweep performed by Dr. Stallworth Right at 8055. No foreign objects or vaginal tears noted.

## 2021-01-07 NOTE — ANESTHESIA POSTPROCEDURE EVALUATION
Department of Anesthesiology  Postprocedure Note    Patient: Andres Teresa  MRN: [id number]  YOB: 1985  Date of evaluation: 1/7/2021  Time:  12:11 PM     Procedure Summary     Date: 01/07/21 Room / Location: Doctor Rincondevin Dempsey [address] [place] [address]    Anesthesia Start: 6153 Anesthesia Stop: 4109    Procedure: OPERATIVE LAPAROSCOPY (N/A ) Diagnosis:       Pelvic pain      (PELVIC PAIN)    Surgeons: Kelly Sherwood MD Responsible Provider: Moira Malloy MD    Anesthesia Type: general ASA Status: 3          Anesthesia Type: general    Deanne Phase I: Deanne Score: 10    Deanne Phase II: Deanne Score: 9    Last vitals: Reviewed and per EMR flowsheets.        Anesthesia Post Evaluation    Patient location during evaluation: PACU  Patient participation: complete - patient participated  Level of consciousness: awake and alert  Pain score: 0  Airway patency: patent  Nausea & Vomiting: no nausea and no vomiting  Complications: no  Cardiovascular status: blood pressure returned to baseline  Respiratory status: acceptable  Hydration status: euvolemic

## 2021-01-07 NOTE — H&P
Date of Surgery Update:  Franklin Hanna was seen, history and physical examination reviewed, and patient examined by me today. There have been no significant clinical changes since the completion of the previous history and physical.    The risk, benefits, and alternatives of the proposed procedure have been explained to the patient (or appropriate guardian) and understanding verbalized. All questions answered. Patient wishes to proceed.     Electronically signed by: SURYA Gillette,4/9/4151,6:55 AM

## 2021-01-11 ENCOUNTER — OFFICE VISIT (OUTPATIENT)
Dept: GYNECOLOGY | Age: 36
End: 2021-01-11

## 2021-01-11 VITALS
BODY MASS INDEX: 42.85 KG/M2 | RESPIRATION RATE: 16 BRPM | DIASTOLIC BLOOD PRESSURE: 73 MMHG | HEART RATE: 94 BPM | HEIGHT: 66 IN | WEIGHT: 266.6 LBS | SYSTOLIC BLOOD PRESSURE: 114 MMHG | OXYGEN SATURATION: 100 % | TEMPERATURE: 97.7 F

## 2021-01-11 DIAGNOSIS — Z98.890 POST-OPERATIVE STATE: Primary | ICD-10-CM

## 2021-01-11 PROCEDURE — 99024 POSTOP FOLLOW-UP VISIT: CPT | Performed by: OBSTETRICS & GYNECOLOGY

## 2021-01-11 NOTE — PROGRESS NOTES
pts doing well after surgery. I discussed surgical findings. Good analgesia. Af, vss  abd- incisions intact and no erythema  Assess:  Normal post op state  Plan:  F/u annual gyn exam or sooner if needed.

## 2021-07-20 ENCOUNTER — HOSPITAL ENCOUNTER (OUTPATIENT)
Dept: GENERAL RADIOLOGY | Age: 36
Discharge: HOME | End: 2021-07-20
Payer: COMMERCIAL

## 2021-07-20 ENCOUNTER — HOSPITAL ENCOUNTER (OUTPATIENT)
Age: 36
Discharge: HOME | End: 2021-07-20
Payer: COMMERCIAL

## 2021-07-20 DIAGNOSIS — M25.512 LEFT SHOULDER PAIN, UNSPECIFIED CHRONICITY: ICD-10-CM

## 2021-07-20 PROCEDURE — 73030 X-RAY EXAM OF SHOULDER: CPT

## 2021-09-14 ENCOUNTER — OFFICE VISIT (OUTPATIENT)
Dept: GYNECOLOGY | Age: 36
End: 2021-09-14
Payer: COMMERCIAL

## 2021-09-14 VITALS
HEIGHT: 66 IN | SYSTOLIC BLOOD PRESSURE: 113 MMHG | WEIGHT: 261.6 LBS | HEART RATE: 87 BPM | BODY MASS INDEX: 42.04 KG/M2 | TEMPERATURE: 98.1 F | DIASTOLIC BLOOD PRESSURE: 78 MMHG

## 2021-09-14 DIAGNOSIS — R10.2 PELVIC PAIN IN FEMALE: ICD-10-CM

## 2021-09-14 DIAGNOSIS — Z01.419 WELL WOMAN EXAM WITH ROUTINE GYNECOLOGICAL EXAM: Primary | ICD-10-CM

## 2021-09-14 PROCEDURE — 99395 PREV VISIT EST AGE 18-39: CPT | Performed by: OBSTETRICS & GYNECOLOGY

## 2021-09-14 NOTE — PROGRESS NOTES
Subjective:      Patient ID: Yana Fink is a 39 y.o. female. HPI  pts here for annual gyn exam.  She continues to have pain x 6 years on her right side. I offered an op lap to remove the right ov and tube and she is open to that. Denies other gyn complaints. Had vacation for two weeks to [address] [place] [address]. Review of Systems Pertinent review of systems items discussed above. All others systems items not discussed above were negative. Objective:   Physical Exam  Constitutional:       Appearance: She is well-developed. HENT:      Head: Normocephalic and atraumatic. Neck:      Thyroid: No thyromegaly. Trachea: No tracheal deviation. Cardiovascular:      Rate and Rhythm: Normal rate and regular rhythm. Heart sounds: Normal heart sounds. No murmur heard. Pulmonary:      Effort: Pulmonary effort is normal. No respiratory distress. Breath sounds: Normal breath sounds. No wheezing or rales. Chest:      Breasts:         Right: No mass, nipple discharge or skin change. Left: No mass, nipple discharge or skin change. Abdominal:      General: There is no distension. Palpations: Abdomen is soft. There is no mass. Tenderness: There is no abdominal tenderness. There is no rebound. Genitourinary:     Labia:         Right: No lesion. Left: No lesion. Vagina: Normal. No foreign body. No vaginal discharge. Cervix: No cervical motion tenderness, discharge or friability. Uterus: Not deviated, not enlarged, not fixed and not tender. Adnexa:         Right: Tenderness present. No mass. Left: No mass or tenderness. Rectum: Normal. No external hemorrhoid. Comments: Pap performed. Musculoskeletal:         General: Normal range of motion. Lymphadenopathy:      Cervical: No cervical adenopathy. Neurological:      Mental Status: She is alert and oriented to person, place, and time.        White speculum  Assessment:   Normal gyn exam, pelvic pain     Plan:   Pelvic US. F/u here in two weeks for pre op appt. Call with results.        Steph Crowley MD

## 2021-09-17 ENCOUNTER — TELEPHONE (OUTPATIENT)
Dept: GYNECOLOGY | Age: 36
End: 2021-09-17

## 2021-09-20 ENCOUNTER — HOSPITAL ENCOUNTER (OUTPATIENT)
Dept: ULTRASOUND IMAGING | Age: 36
Discharge: HOME | End: 2021-09-20
Payer: COMMERCIAL

## 2021-09-20 DIAGNOSIS — R10.2 PELVIC PAIN IN FEMALE: ICD-10-CM

## 2021-09-20 PROCEDURE — 76856 US EXAM PELVIC COMPLETE: CPT

## 2021-09-28 ENCOUNTER — OFFICE VISIT (OUTPATIENT)
Dept: GYNECOLOGY | Age: 36
End: 2021-09-28
Payer: COMMERCIAL

## 2021-09-28 VITALS
DIASTOLIC BLOOD PRESSURE: 84 MMHG | HEART RATE: 89 BPM | TEMPERATURE: 97.1 F | BODY MASS INDEX: 41.64 KG/M2 | WEIGHT: 258 LBS | SYSTOLIC BLOOD PRESSURE: 129 MMHG

## 2021-09-28 DIAGNOSIS — K40.20 NON-RECURRENT BILATERAL INGUINAL HERNIA WITHOUT OBSTRUCTION OR GANGRENE: Primary | ICD-10-CM

## 2021-09-28 PROCEDURE — G8417 CALC BMI ABV UP PARAM F/U: HCPCS | Performed by: OBSTETRICS & GYNECOLOGY

## 2021-09-28 PROCEDURE — G8427 DOCREV CUR MEDS BY ELIG CLIN: HCPCS | Performed by: OBSTETRICS & GYNECOLOGY

## 2021-09-28 PROCEDURE — 99213 OFFICE O/P EST LOW 20 MIN: CPT | Performed by: OBSTETRICS & GYNECOLOGY

## 2021-09-28 PROCEDURE — 1036F TOBACCO NON-USER: CPT | Performed by: OBSTETRICS & GYNECOLOGY

## 2021-09-28 RX ORDER — MEDROXYPROGESTERONE ACETATE 10 MG/1
TABLET ORAL
Qty: 10 TABLET | Refills: 11 | Status: SHIPPED | OUTPATIENT
Start: 2021-09-28 | End: 2022-09-28 | Stop reason: SDUPTHER

## 2021-09-28 NOTE — PROGRESS NOTES
Pt presents with right sided abd pain. She is able to pin point the area near her ant sup iliac spine. Initially we were going to talk about removing her right ov and tube. US was normal.  Now since she's able to point to the spot where she has the discomfort I expressed concern that she may have a hernia. Recommend referral to general surgeon to evaluate for hernia. All questions answered. Refer to Laurence/Juan. 20 min >50% of time was spent discussing findings, management, and treatment options.

## 2021-10-04 ENCOUNTER — OFFICE VISIT (OUTPATIENT)
Dept: SURGERY | Age: 36
End: 2021-10-04

## 2021-10-04 VITALS
BODY MASS INDEX: 41.46 KG/M2 | WEIGHT: 258 LBS | SYSTOLIC BLOOD PRESSURE: 110 MMHG | DIASTOLIC BLOOD PRESSURE: 70 MMHG | HEIGHT: 66 IN

## 2021-10-04 DIAGNOSIS — D17.1 LIPOMA OF ABDOMINAL WALL: Primary | ICD-10-CM

## 2021-10-04 NOTE — PATIENT INSTRUCTIONS
Lipoma removal scheduled for 11/3/21 at 9 arrive at 7:30. Nothing to eat or drink after midnight. You will need someone to bring you home.

## 2021-10-05 NOTE — PROGRESS NOTES
Araseli Benitez (:  1985) is a 39 y.o. female,Established patient, here for evaluation of the following chief complaint(s):  Follow-up (Pt is here today for right groin pain. )         ASSESSMENT/PLAN:  1. Lipoma of abdominal wall      Removal in OR         Subjective   SUBJECTIVE/OBJECTIVE:  HPI  New lipomas noted in RLQ of the abdominal wall. Increasing in size and now causing pain with direct pressure, especially when lying down. For removal in OR    Review of Systems       Objective   Physical Exam       Electronically signed by Yair Álvarez MD on 10/5/2021 at 12:01 PM        An electronic signature was used to authenticate this note.     --Yair Álvarez MD

## 2021-10-14 ENCOUNTER — TELEPHONE (OUTPATIENT)
Dept: SURGERY | Age: 36
End: 2021-10-14

## 2021-10-14 DIAGNOSIS — Z01.818 PRE-OP TESTING: ICD-10-CM

## 2021-10-14 DIAGNOSIS — D17.1 LIPOMA OF ABDOMINAL WALL: Primary | ICD-10-CM

## 2021-10-14 NOTE — TELEPHONE ENCOUNTER
Patient is scheduled for surgery 11/3. Has a blood disorder that requires her to have a CBC beforehand. She forgot to mention this when she saw Dr. Corrinne Distel.

## 2021-10-28 DIAGNOSIS — D17.1 LIPOMA OF ABDOMINAL WALL: ICD-10-CM

## 2021-10-28 DIAGNOSIS — Z01.818 PRE-OP TESTING: ICD-10-CM

## 2021-10-28 LAB
BASOPHILS ABSOLUTE: 0 K/UL (ref 0–0.2)
BASOPHILS RELATIVE PERCENT: 0.9 %
EOSINOPHILS ABSOLUTE: 0.1 K/UL (ref 0–0.6)
EOSINOPHILS RELATIVE PERCENT: 1.7 %
HCT VFR BLD CALC: 40.9 % (ref 36–48)
HEMOGLOBIN: 13.2 G/DL (ref 12–16)
LYMPHOCYTES ABSOLUTE: 1.5 K/UL (ref 1–5.1)
LYMPHOCYTES RELATIVE PERCENT: 30.9 %
MCH RBC QN AUTO: 28.4 PG (ref 26–34)
MCHC RBC AUTO-ENTMCNC: 32.3 G/DL (ref 31–36)
MCV RBC AUTO: 88 FL (ref 80–100)
MONOCYTES ABSOLUTE: 0.7 K/UL (ref 0–1.3)
MONOCYTES RELATIVE PERCENT: 14.7 %
NEUTROPHILS ABSOLUTE: 2.5 K/UL (ref 1.7–7.7)
NEUTROPHILS RELATIVE PERCENT: 51.8 %
PDW BLD-RTO: 14.3 % (ref 12.4–15.4)
PLATELET # BLD: 42 K/UL (ref 135–450)
PMV BLD AUTO: 11.9 FL (ref 5–10.5)
RBC # BLD: 4.65 M/UL (ref 4–5.2)
WBC # BLD: 4.8 K/UL (ref 4–11)

## 2021-10-28 NOTE — PROGRESS NOTES
C-Difficile admission screening and protocol:     * Admitted with diarrhea? YES____    NO__X___     *Prior history of C-Diff. In last 3 months? YES____   NO___X__     *Antibiotic use in the past 6-8 weeks? NO__X____YES______                 If yes which  ANTIBIOTIC AND REASON______     *Prior hospitalization or nursing home in the last month?  YES____   NO__X__

## 2021-10-28 NOTE — PROGRESS NOTES
[address] [facility] time___0725_________        Surgery time______0855______    Take the following medications with a sip of water: Follow your MD/Surgeons pre-procedure instructions regarding your medications    Do not eat or drink anything after 12:00 midnight prior to your surgery. This includes water chewing gum, mints and ice chips. You may brush your teeth and gargle the morning of your surgery, but do not swallow the water     Please see your family doctor/pediatrician for a history and physical and/or concerning medications. Bring any test results/reports from your physicians office. If you are under the care of a heart doctor or specialist doctor, please be aware that you may be asked to them for clearance    You may be asked to stop blood thinners such as Coumadin, Plavix, Fragmin, Lovenox, etc., or any anti-inflammatories such as:  Aspirin, Ibuprofen, Advil, Naproxen prior to your surgery. We also ask that you stop any OTC medications such as fish oil, vitamin E, glucosamine, garlic, Multivitamins, COQ 10, etc.    We ask that you do not smoke 24 hours prior to surgery  We ask that you do not  drink any alcoholic beverages 24 hours prior to surgery     You must make arrangements for a responsible adult to take you home after your surgery. For your safety you will not be allowed to leave alone or drive yourself home. Your surgery will be cancelled if you do not have a ride home. Also for your safety, it is strongly suggested that someone stay with you the first 24 hours after your surgery. A parent or legal guardian must accompany a child scheduled for surgery and plan to stay at the hospital until the child is discharged. Please do not bring other children with you. For your comfort, please wear simple loose fitting clothing to the hospital.  Please do not bring valuables.     Do not wear any make-up or nail polish on your fingers or toes      For your safety, please do not wear any jewelry or body piercing's on the day of surgery. All jewelry must be removed. If you have dentures, they will be removed before going to operating room. For your convenience, we will provide you with a container. If you wear contact lenses or glasses, they will be removed, please bring a case for them. If you have a living will and a durable power of  for healthcare, please bring in a copy. As part of our patient safety program to minimize surgical site infections, we ask you to do the following:    · Please notify your surgeon if you develop any illness between         now and the  day of your surgery. · This includes a cough, cold, fever, sore throat, nausea,         or vomiting, and diarrhea, etc.  ·  Please notify your surgeon if you experience dizziness, shortness         of breath or blurred vision between now and the time of your surgery. Do not shave your operative site 96 hours prior to surgery. For face and neck surgery, men may use an electric razor 48 hours   prior to surgery. You may shower the night before surgery or the morning of   your surgery with an antibacterial soap. You will need to bring a photo ID and insurance card    [facility] has an onsite pharmacy, would you like to utilize our pharmacy     If you will be staying overnight and use a C-pap machine, please bring   your C-pap to hospital     Our goal is to provide you with excellent care, therefore, visitors will be limited to two(2) in the room at a time so that we may focus on providing this care for you. Please contact pre-admission testing if you have any further questions. [facility] phone number:  [phone] PAT fax number:  [phone]  Please note these are generalized instructions for all surgical cases, you may be provided with more specific instructions according to your surgery.

## 2021-11-02 ENCOUNTER — ANESTHESIA EVENT (OUTPATIENT)
Dept: OPERATING ROOM | Age: 36
End: 2021-11-02
Payer: COMMERCIAL

## 2021-11-03 ENCOUNTER — ANESTHESIA (OUTPATIENT)
Dept: OPERATING ROOM | Age: 36
End: 2021-11-03
Payer: COMMERCIAL

## 2021-11-03 ENCOUNTER — HOSPITAL ENCOUNTER (OUTPATIENT)
Age: 36
Setting detail: OUTPATIENT SURGERY
Discharge: HOME | End: 2021-11-03
Attending: SURGERY | Admitting: SURGERY
Payer: COMMERCIAL

## 2021-11-03 VITALS
RESPIRATION RATE: 16 BRPM | WEIGHT: 257.72 LBS | OXYGEN SATURATION: 97 % | HEART RATE: 81 BPM | TEMPERATURE: 97.7 F | HEIGHT: 66 IN | DIASTOLIC BLOOD PRESSURE: 77 MMHG | BODY MASS INDEX: 41.42 KG/M2 | SYSTOLIC BLOOD PRESSURE: 123 MMHG

## 2021-11-03 VITALS
DIASTOLIC BLOOD PRESSURE: 53 MMHG | TEMPERATURE: 96.8 F | OXYGEN SATURATION: 96 % | RESPIRATION RATE: 11 BRPM | SYSTOLIC BLOOD PRESSURE: 96 MMHG

## 2021-11-03 DIAGNOSIS — D17.1 LIPOMA OF TORSO: ICD-10-CM

## 2021-11-03 DIAGNOSIS — D17.23 LIPOMA OF RIGHT THIGH: Primary | ICD-10-CM

## 2021-11-03 LAB
HCT VFR BLD CALC: 42.7 % (ref 36–48)
HEMOGLOBIN: 13.9 G/DL (ref 12–16)
MCH RBC QN AUTO: 28.2 PG (ref 26–34)
MCHC RBC AUTO-ENTMCNC: 32.5 G/DL (ref 31–36)
MCV RBC AUTO: 86.8 FL (ref 80–100)
PDW BLD-RTO: 14.2 % (ref 12.4–15.4)
PLATELET # BLD: 54 K/UL (ref 135–450)
PLATELET SLIDE REVIEW: ADEQUATE
PMV BLD AUTO: 11.1 FL (ref 5–10.5)
PREGNANCY, URINE: NEGATIVE
RBC # BLD: 4.93 M/UL (ref 4–5.2)
SARS-COV-2, NAAT: NOT DETECTED
SLIDE REVIEW: ABNORMAL
WBC # BLD: 7.9 K/UL (ref 4–11)

## 2021-11-03 PROCEDURE — 2709999900 HC NON-CHARGEABLE SUPPLY: Performed by: SURGERY

## 2021-11-03 PROCEDURE — 22903 EXC ABD LES SC 3 CM/>: CPT | Performed by: SURGERY

## 2021-11-03 PROCEDURE — 3600000002 HC SURGERY LEVEL 2 BASE: Performed by: SURGERY

## 2021-11-03 PROCEDURE — 6360000002 HC RX W HCPCS

## 2021-11-03 PROCEDURE — 2580000003 HC RX 258: Performed by: ANESTHESIOLOGY

## 2021-11-03 PROCEDURE — 7100000000 HC PACU RECOVERY - FIRST 15 MIN: Performed by: SURGERY

## 2021-11-03 PROCEDURE — 3600000012 HC SURGERY LEVEL 2 ADDTL 15MIN: Performed by: SURGERY

## 2021-11-03 PROCEDURE — 6360000002 HC RX W HCPCS: Performed by: SURGERY

## 2021-11-03 PROCEDURE — 88304 TISSUE EXAM BY PATHOLOGIST: CPT

## 2021-11-03 PROCEDURE — 84703 CHORIONIC GONADOTROPIN ASSAY: CPT

## 2021-11-03 PROCEDURE — 3700000001 HC ADD 15 MINUTES (ANESTHESIA): Performed by: SURGERY

## 2021-11-03 PROCEDURE — 7100000001 HC PACU RECOVERY - ADDTL 15 MIN: Performed by: SURGERY

## 2021-11-03 PROCEDURE — 2500000003 HC RX 250 WO HCPCS: Performed by: SURGERY

## 2021-11-03 PROCEDURE — 87635 SARS-COV-2 COVID-19 AMP PRB: CPT

## 2021-11-03 PROCEDURE — 7100000011 HC PHASE II RECOVERY - ADDTL 15 MIN: Performed by: SURGERY

## 2021-11-03 PROCEDURE — 7100000010 HC PHASE II RECOVERY - FIRST 15 MIN: Performed by: SURGERY

## 2021-11-03 PROCEDURE — 2500000003 HC RX 250 WO HCPCS

## 2021-11-03 PROCEDURE — 85027 COMPLETE CBC AUTOMATED: CPT

## 2021-11-03 PROCEDURE — 3700000000 HC ANESTHESIA ATTENDED CARE: Performed by: SURGERY

## 2021-11-03 RX ORDER — FENTANYL CITRATE 50 UG/ML
INJECTION, SOLUTION INTRAMUSCULAR; INTRAVENOUS PRN
Status: DISCONTINUED | OUTPATIENT
Start: 2021-11-03 | End: 2021-11-03 | Stop reason: SDUPTHER

## 2021-11-03 RX ORDER — LABETALOL HYDROCHLORIDE 5 MG/ML
5 INJECTION, SOLUTION INTRAVENOUS EVERY 10 MIN PRN
Status: DISCONTINUED | OUTPATIENT
Start: 2021-11-03 | End: 2021-11-03 | Stop reason: HOSPADM

## 2021-11-03 RX ORDER — FENTANYL CITRATE 50 UG/ML
50 INJECTION, SOLUTION INTRAMUSCULAR; INTRAVENOUS EVERY 5 MIN PRN
Status: DISCONTINUED | OUTPATIENT
Start: 2021-11-03 | End: 2021-11-03 | Stop reason: HOSPADM

## 2021-11-03 RX ORDER — PROPOFOL 10 MG/ML
INJECTION, EMULSION INTRAVENOUS CONTINUOUS PRN
Status: DISCONTINUED | OUTPATIENT
Start: 2021-11-03 | End: 2021-11-03 | Stop reason: SDUPTHER

## 2021-11-03 RX ORDER — SODIUM CHLORIDE 0.9 % (FLUSH) 0.9 %
10 SYRINGE (ML) INJECTION EVERY 12 HOURS SCHEDULED
Status: DISCONTINUED | OUTPATIENT
Start: 2021-11-03 | End: 2021-11-03 | Stop reason: HOSPADM

## 2021-11-03 RX ORDER — SODIUM CHLORIDE 0.9 % (FLUSH) 0.9 %
10 SYRINGE (ML) INJECTION PRN
Status: DISCONTINUED | OUTPATIENT
Start: 2021-11-03 | End: 2021-11-03 | Stop reason: HOSPADM

## 2021-11-03 RX ORDER — DIPHENHYDRAMINE HYDROCHLORIDE 50 MG/ML
12.5 INJECTION INTRAMUSCULAR; INTRAVENOUS
Status: DISCONTINUED | OUTPATIENT
Start: 2021-11-03 | End: 2021-11-03 | Stop reason: HOSPADM

## 2021-11-03 RX ORDER — TRAMADOL HYDROCHLORIDE 50 MG/1
50 TABLET ORAL EVERY 6 HOURS PRN
Qty: 12 TABLET | Refills: 0 | Status: SHIPPED | OUTPATIENT
Start: 2021-11-03 | End: 2021-11-06

## 2021-11-03 RX ORDER — SODIUM CHLORIDE 9 MG/ML
25 INJECTION, SOLUTION INTRAVENOUS PRN
Status: DISCONTINUED | OUTPATIENT
Start: 2021-11-03 | End: 2021-11-03 | Stop reason: HOSPADM

## 2021-11-03 RX ORDER — LIDOCAINE HYDROCHLORIDE 20 MG/ML
INJECTION, SOLUTION EPIDURAL; INFILTRATION; INTRACAUDAL; PERINEURAL PRN
Status: DISCONTINUED | OUTPATIENT
Start: 2021-11-03 | End: 2021-11-03 | Stop reason: SDUPTHER

## 2021-11-03 RX ORDER — MEPERIDINE HYDROCHLORIDE 25 MG/ML
12.5 INJECTION INTRAMUSCULAR; INTRAVENOUS; SUBCUTANEOUS EVERY 5 MIN PRN
Status: DISCONTINUED | OUTPATIENT
Start: 2021-11-03 | End: 2021-11-03 | Stop reason: HOSPADM

## 2021-11-03 RX ORDER — FENTANYL CITRATE 50 UG/ML
25 INJECTION, SOLUTION INTRAMUSCULAR; INTRAVENOUS EVERY 5 MIN PRN
Status: DISCONTINUED | OUTPATIENT
Start: 2021-11-03 | End: 2021-11-03 | Stop reason: HOSPADM

## 2021-11-03 RX ORDER — OXYCODONE HYDROCHLORIDE AND ACETAMINOPHEN 5; 325 MG/1; MG/1
1 TABLET ORAL
Status: DISCONTINUED | OUTPATIENT
Start: 2021-11-03 | End: 2021-11-03 | Stop reason: HOSPADM

## 2021-11-03 RX ORDER — ONDANSETRON 2 MG/ML
INJECTION INTRAMUSCULAR; INTRAVENOUS PRN
Status: DISCONTINUED | OUTPATIENT
Start: 2021-11-03 | End: 2021-11-03 | Stop reason: SDUPTHER

## 2021-11-03 RX ORDER — PROMETHAZINE HYDROCHLORIDE 25 MG/ML
6.25 INJECTION, SOLUTION INTRAMUSCULAR; INTRAVENOUS
Status: DISCONTINUED | OUTPATIENT
Start: 2021-11-03 | End: 2021-11-03 | Stop reason: HOSPADM

## 2021-11-03 RX ORDER — DEXAMETHASONE SODIUM PHOSPHATE 4 MG/ML
INJECTION, SOLUTION INTRA-ARTICULAR; INTRALESIONAL; INTRAMUSCULAR; INTRAVENOUS; SOFT TISSUE PRN
Status: DISCONTINUED | OUTPATIENT
Start: 2021-11-03 | End: 2021-11-03 | Stop reason: SDUPTHER

## 2021-11-03 RX ORDER — ONDANSETRON 2 MG/ML
4 INJECTION INTRAMUSCULAR; INTRAVENOUS
Status: DISCONTINUED | OUTPATIENT
Start: 2021-11-03 | End: 2021-11-03 | Stop reason: HOSPADM

## 2021-11-03 RX ORDER — PROPOFOL 10 MG/ML
INJECTION, EMULSION INTRAVENOUS PRN
Status: DISCONTINUED | OUTPATIENT
Start: 2021-11-03 | End: 2021-11-03 | Stop reason: SDUPTHER

## 2021-11-03 RX ORDER — SODIUM CHLORIDE 9 MG/ML
INJECTION, SOLUTION INTRAVENOUS CONTINUOUS
Status: DISCONTINUED | OUTPATIENT
Start: 2021-11-03 | End: 2021-11-03 | Stop reason: HOSPADM

## 2021-11-03 RX ORDER — MIDAZOLAM HYDROCHLORIDE 1 MG/ML
INJECTION INTRAMUSCULAR; INTRAVENOUS PRN
Status: DISCONTINUED | OUTPATIENT
Start: 2021-11-03 | End: 2021-11-03 | Stop reason: SDUPTHER

## 2021-11-03 RX ADMIN — FENTANYL CITRATE 25 MCG: 50 INJECTION INTRAMUSCULAR; INTRAVENOUS at 09:22

## 2021-11-03 RX ADMIN — ONDANSETRON 4 MG: 2 INJECTION INTRAMUSCULAR; INTRAVENOUS at 09:13

## 2021-11-03 RX ADMIN — PROPOFOL 20 MG: 10 INJECTION, EMULSION INTRAVENOUS at 09:05

## 2021-11-03 RX ADMIN — PROPOFOL 80 MG: 10 INJECTION, EMULSION INTRAVENOUS at 08:55

## 2021-11-03 RX ADMIN — FENTANYL CITRATE 50 MCG: 50 INJECTION INTRAMUSCULAR; INTRAVENOUS at 09:15

## 2021-11-03 RX ADMIN — PROPOFOL 120 MCG/KG/MIN: 10 INJECTION, EMULSION INTRAVENOUS at 08:55

## 2021-11-03 RX ADMIN — SODIUM CHLORIDE: 9 INJECTION, SOLUTION INTRAVENOUS at 08:16

## 2021-11-03 RX ADMIN — FENTANYL CITRATE 25 MCG: 50 INJECTION INTRAMUSCULAR; INTRAVENOUS at 08:55

## 2021-11-03 RX ADMIN — MIDAZOLAM 2 MG: 1 INJECTION INTRAMUSCULAR; INTRAVENOUS at 08:51

## 2021-11-03 RX ADMIN — DEXAMETHASONE SODIUM PHOSPHATE 4 MG: 4 INJECTION, SOLUTION INTRAMUSCULAR; INTRAVENOUS at 09:13

## 2021-11-03 RX ADMIN — CEFAZOLIN 2000 MG: 10 INJECTION, POWDER, FOR SOLUTION INTRAVENOUS at 08:51

## 2021-11-03 RX ADMIN — LIDOCAINE HYDROCHLORIDE 100 MG: 20 INJECTION, SOLUTION EPIDURAL; INFILTRATION; INTRACAUDAL; PERINEURAL at 08:55

## 2021-11-03 ASSESSMENT — PULMONARY FUNCTION TESTS
PIF_VALUE: 5
PIF_VALUE: 4
PIF_VALUE: 0
PIF_VALUE: 1
PIF_VALUE: 5
PIF_VALUE: 1
PIF_VALUE: 16
PIF_VALUE: 3
PIF_VALUE: 5
PIF_VALUE: 1
PIF_VALUE: 1
PIF_VALUE: 0
PIF_VALUE: 4
PIF_VALUE: 1
PIF_VALUE: 5
PIF_VALUE: 20
PIF_VALUE: 8
PIF_VALUE: 1
PIF_VALUE: 1
PIF_VALUE: 5
PIF_VALUE: 1
PIF_VALUE: 8
PIF_VALUE: 0
PIF_VALUE: 1
PIF_VALUE: 4
PIF_VALUE: 6
PIF_VALUE: 1
PIF_VALUE: 0
PIF_VALUE: 4

## 2021-11-03 ASSESSMENT — PAIN - FUNCTIONAL ASSESSMENT
PAIN_FUNCTIONAL_ASSESSMENT: ACTIVITIES ARE NOT PREVENTED
PAIN_FUNCTIONAL_ASSESSMENT: 0-10

## 2021-11-03 ASSESSMENT — PAIN SCALES - GENERAL
PAINLEVEL_OUTOF10: 0
PAINLEVEL_OUTOF10: 0

## 2021-11-03 ASSESSMENT — ENCOUNTER SYMPTOMS: SHORTNESS OF BREATH: 0

## 2021-11-03 ASSESSMENT — LIFESTYLE VARIABLES: SMOKING_STATUS: 0

## 2021-11-03 ASSESSMENT — PAIN DESCRIPTION - DESCRIPTORS: DESCRIPTORS: ACHING

## 2021-11-03 NOTE — ANESTHESIA PRE PROCEDURE
Department of Anesthesiology  Preprocedure Note       Name:  Clary Gonzales   Age:  39 y.o.  :  1985                                          MRN:  [id number]         Date:  11/3/2021      Surgeon: Theresa Phelps):  Erlinda Vo MD    Procedure: Procedure(s):  REMOVAL LIPOMA RIGHT GROIN    Medications prior to admission:   Prior to Admission medications    Medication Sig Start Date End Date Taking? Authorizing Provider   medroxyPROGESTERone (PROVERA) 10 MG tablet TAKE ONE TABLET BY MOUTH DAILY FOR THE FIRST 10 DAYS ON THE MONTH 21  Yes Jose Alejandro Benson MD   omeprazole (PRILOSEC) 40 MG delayed release capsule  20  Yes Historical Provider, MD   Multiple Vitamins-Minerals (ADULT ONE DAILY GUMMIES) CHEW Take 1 tablet by mouth daily   Yes Historical Provider, MD   levothyroxine (SYNTHROID) 100 MCG tablet Take 100 mcg by mouth daily   Yes Historical Provider, MD   penicillin v potassium (VEETID) 250 MG tablet Take 250 mg by mouth every 12 hours.    Yes Historical Provider, MD   STRATTERA 80 MG capsule TAKE ONE CAPSULE BY MOUTH DAILY 16  Yes Eladia Ayala MD   venlafaxine (EFFEXOR XR) 150 MG extended release capsule Take 1 capsule by mouth daily (with breakfast)  Patient taking differently: Take 150 mg by mouth 2 times daily  10/7/16  Yes Eladia Ayala MD   polyethylene glycol (GLYCOLAX) powder Take 17 g by mouth nightly   Yes Historical Provider, MD       Current medications:    Current Facility-Administered Medications   Medication Dose Route Frequency Provider Last Rate Last Admin    0.9 % sodium chloride infusion   IntraVENous Continuous Marie Floyd MD        sodium chloride flush 0.9 % injection 10 mL  10 mL IntraVENous 2 times per day Marie Floyd MD        sodium chloride flush 0.9 % injection 10 mL  10 mL IntraVENous PRN Marie Floyd MD        0.9 % sodium chloride infusion  25 mL IntraVENous PRN Marie Floyd MD        ceFAZolin (ANCEF) 2000 mg in dextrose 5 % 100 mL IVPB  2,000 mg IntraVENous Once Monik Hassan MD           Allergies:     Allergies   Allergen Reactions    Immune Globulin Anaphylaxis and Other (See Comments)     Shock    Immune Globulins Shortness Of Breath     IVIG low blood pressure    Rho (D) Immune Globulin Anaphylaxis and Other (See Comments)     Shock    Winrho Sdf [Rho D Immune Globulin] Anaphylaxis     Low blood pressure       Problem List:    Patient Active Problem List   Diagnosis Code    ADHD (attention deficit hyperactivity disorder) F90.9    S/P splenectomy Z90.81    Auditory processing disorder H93.25    Right low back pain M54.50    Idiopathic thrombocytopenic purpura (HCC) D69.3    Closed nondisplaced fracture of lateral malleolus of right fibula S82.64XA    Contusion of left foot S90.32XA    Inability to ambulate due to ankle or foot R26.2    Closed nondisplaced fracture of fourth metatarsal bone of right foot S92.344A    Closed nondisplaced fracture of lateral malleolus of left fibula S82.65XA    Closed nondisplaced fracture of second metatarsal bone of right foot S92.324A    Lipoma of torso D17.1    Pelvic pain in female R10.2       Past Medical History:        Diagnosis Date    Anxiety and depression     Auditory processing disorder     delay of understanding what is being said--may need to repeat instructions    GERD (gastroesophageal reflux disease)     Hypothyroidism     hypothyroidism    IBS (irritable bowel syndrome)     ITP (idiopathic thrombocytopenic purpura)     S/P splenectomy     Wears glasses        Past Surgical History:        Procedure Laterality Date    BONE MARROW BIOPSY  2004    CHOLECYSTECTOMY      LAPAROSCOPY N/A 1/7/2021    OPERATIVE LAPAROSCOPY performed by Christine Tobias MD at [address] [place] [address]  04/13/2018    right groin exploration lipoma removal    SPLENECTOMY  2007       Social History:    Social History     Tobacco Use    Smoking status: Never Smoker    Smokeless tobacco: Never Used   Substance Use Topics    Alcohol use: No                                Counseling given: Not Answered      Vital Signs (Current):   Vitals:    10/28/21 1118 11/03/21 0748   BP:  137/86   Pulse:  87   Resp:  16   Temp:  98.8 °F (37.1 °C)   TempSrc:  Temporal   SpO2:  98%   Weight: 258 lb (117 kg) 257 lb 11.5 oz (116.9 kg)   Height: 5' 6\" (1.676 m) 5' 6\" (1.676 m)                                              BP Readings from Last 3 Encounters:   11/03/21 137/86   10/04/21 110/70   09/28/21 129/84       NPO Status: Time of last liquid consumption: 2200                        Time of last solid consumption: 2000                        Date of last liquid consumption: 11/02/21                        Date of last solid food consumption: 11/02/21    BMI:   Wt Readings from Last 3 Encounters:   11/03/21 257 lb 11.5 oz (116.9 kg)   10/04/21 258 lb (117 kg)   09/28/21 258 lb (117 kg)     Body mass index is 41.6 kg/m². CBC:   Lab Results   Component Value Date    WBC 4.8 10/28/2021    RBC 4.65 10/28/2021    RBC 4.77 01/20/2017    HGB 13.2 10/28/2021    HCT 40.9 10/28/2021    MCV 88.0 10/28/2021    RDW 14.3 10/28/2021    PLT 42 10/28/2021       CMP:   Lab Results   Component Value Date     10/05/2017    K 4.6 10/05/2017     10/05/2017    CO2 24 10/05/2017    BUN 13 10/05/2017    CREATININE 0.8 10/05/2017    GFRAA >60 10/05/2017    AGRATIO 1.1 10/05/2017    LABGLOM >60 10/05/2017    GLUCOSE 96 10/05/2017    PROT 7.2 10/05/2017    CALCIUM 9.2 10/05/2017    BILITOT <0.2 10/05/2017    ALKPHOS 111 10/05/2017    AST 20 10/05/2017    ALT 22 10/05/2017       POC Tests: No results for input(s): POCGLU, POCNA, POCK, POCCL, POCBUN, POCHEMO, POCHCT in the last 72 hours.     Coags:   Lab Results   Component Value Date    PROTIME 12.2 06/30/2017    INR 1.08 06/30/2017    APTT 28.7 06/30/2017       HCG (If Applicable):   Lab Results   Component Value Date    PREGTESTUR Negative 01/07/2021        ABGs: No results found for: PHART, PO2ART, GKF2MMH, ACK0DNW, BEART, E3UHSAII     Type & Screen (If Applicable):  No results found for: LABABO, LABRH    Drug/Infectious Status (If Applicable):  No results found for: HIV, HEPCAB    COVID-19 Screening (If Applicable):   Lab Results   Component Value Date    COVID19 NOT DETECTED 01/04/2021           Anesthesia Evaluation  Patient summary reviewed no history of anesthetic complications:   Airway: Mallampati: III  TM distance: >3 FB   Neck ROM: full  Mouth opening: > = 3 FB Dental: normal exam         Pulmonary:Negative Pulmonary ROS       (-) shortness of breath and not a current smoker          Patient did not smoke on day of surgery. Cardiovascular:Negative CV ROS        (-) pacemaker, past MI, CABG/stent and  angina       Beta Blocker:  Not on Beta Blocker         Neuro/Psych:   Negative Neuro/Psych ROS     (-) seizures and CVA           GI/Hepatic/Renal: Neg GI/Hepatic/Renal ROS  (+) GERD:,      (-) liver disease and no renal disease       Endo/Other:    (+) hypothyroidism, blood dyscrasia: thrombocytopenia:., .    (-) diabetes mellitus, hyperthyroidism               Abdominal:   (+) obese,           Vascular: negative vascular ROS. Other Findings:             Anesthesia Plan      MAC     ASA 3     (This pre-anesthesia assessment may be used as a history and physical.    DOS STAFF ADDENDUM:    Pt seen and examined, chart reviewed (including anesthesia, drug and allergy history). No interval changes to history and physical examination. Anesthetic plan, risks, benefits, alternatives, and personnel involved discussed with patient. Patient verbalized an understanding and agrees to proceed. Mario Alberto Nicole MD  November 3, 2021  8:00 AM    )  Induction: intravenous. Anesthetic plan and risks discussed with patient. Plan discussed with CRNA.                   Mario Alberto Nicole MD   11/3/2021

## 2021-11-03 NOTE — H&P
PATIENT NAME: Felipe Dalton   YOB: 1985    ADMISSION DATE: 11/3/2021  7:21 AM      TODAY'S DATE: 11/3/2021    CHIEF COMPLAINT:  lipoma      HISTORY OF PRESENT ILLNESS:  The patient is a 39 y.o. female  who presents with a painful lipoma in RLQ of the abdomen. Noted several months ago with worsening pain over past few weeks. Previous lipoma removed from the abdomen with resolution of pain. Plan removal today. Past Medical History:        Diagnosis Date    Anxiety and depression     Auditory processing disorder     delay of understanding what is being said--may need to repeat instructions    GERD (gastroesophageal reflux disease)     Hypothyroidism     hypothyroidism    IBS (irritable bowel syndrome)     ITP (idiopathic thrombocytopenic purpura)     S/P splenectomy     Wears glasses        Past Surgical History:        Procedure Laterality Date    BONE MARROW BIOPSY  2004    CHOLECYSTECTOMY      LAPAROSCOPY N/A 1/7/2021    OPERATIVE LAPAROSCOPY performed by Demetria Lion MD at [address] [place] [address]  04/13/2018    right groin exploration lipoma removal    SPLENECTOMY  2007       Medications Prior to Admission:   Medications Prior to Admission: medroxyPROGESTERone (PROVERA) 10 MG tablet, TAKE ONE TABLET BY MOUTH DAILY FOR THE FIRST 10 DAYS ON THE MONTH  omeprazole (PRILOSEC) 40 MG delayed release capsule,   Multiple Vitamins-Minerals (ADULT ONE DAILY GUMMIES) CHEW, Take 1 tablet by mouth daily  levothyroxine (SYNTHROID) 100 MCG tablet, Take 100 mcg by mouth daily  penicillin v potassium (VEETID) 250 MG tablet, Take 250 mg by mouth every 12 hours.   STRATTERA 80 MG capsule, TAKE ONE CAPSULE BY MOUTH DAILY  venlafaxine (EFFEXOR XR) 150 MG extended release capsule, Take 1 capsule by mouth daily (with breakfast) (Patient taking differently: Take 150 mg by mouth 2 times daily )  polyethylene glycol (GLYCOLAX) powder, Take 17 g by mouth nightly    Allergies:  Immune globulin, Immune globulins, Rho (d) immune globulin, and Winrho sdf [rho d immune globulin]    Social History:   TOBACCO:   reports that she has never smoked. She has never used smokeless tobacco.  ETOH:   reports no history of alcohol use. DRUGS:   reports no history of drug use. Family History:       Problem Relation Age of Onset    Diabetes Mother     Hypertension Mother     Irritable Bowel Syndrome Mother    [facility] Cancer Father         leukemia    Hypertension Father     Diabetes Brother        REVIEW OF SYSTEMS:    CONSTITUTIONAL:  negative  HEENT:  negative  CARDIOVASCULAR:  negative  GASTROINTESTINAL:  negative  GENITOURINARY:  negative  HEMATOLOGIC/LYMPHATIC:  negative  ENDOCRINE:  negative  All other systems negative    PHYSICAL EXAM:    VITALS:  /86   Pulse 87   Temp 98.8 °F (37.1 °C) (Temporal)   Resp 16   Ht 5' 6\" (1.676 m)   Wt 257 lb 11.5 oz (116.9 kg)   LMP 10/10/2021   SpO2 98%   BMI 41.60 kg/m²   INTAKE/OUTPUT:   No intake/output data recorded. No intake/output data recorded. CONSTITUTIONAL:  awake, alert, no apparent distress and moderately obese  ENT:  normocepalic, without obvious abnormality  NECK:  supple, symmetrical, trachea midline   LUNGS:  clear to auscultation, no crackles or wheezing  CARDIOVASCULAR:  regular rate and rhythm and no murmur noted  ABDOMEN: 2-3 cm lipoma RLQ, tender to palpation  MUSCULOSKELETAL:  0+ pitting edema lower extremities  NEUROLOGIC:  Mental Status Exam:  Level of Alertness:   awake  Orientation:   person, place, time      ASSESSMENT AND PLAN:    Lipoma RLQ abdomen  For removal today  The risks, benefits and alternatives to the planned procedure were discussed. Patient expressed an understanding and is willing to proceed.     Electronically signed by Saúl Lomas MD on 11/3/2021 at 8:36 AM    Saúl Lomas MD

## 2021-11-03 NOTE — PROGRESS NOTES
Slowly waking up, oriented. No c/o. Vss. Tolerated sitting up and po fluids and cookies well. Dad at bedside. Verbalized understanding of discharge instructions.

## 2021-11-03 NOTE — BRIEF OP NOTE
Brief Postoperative Note      Patient: Miladis Hook  YOB: 1985  MRN: [id number]    Date of Procedure: 11/3/2021    Pre-Op Diagnosis: LIPOMA RIGHT lower quadrant abdomen    Post-Op Diagnosis: Same       Procedure: removal of subcutaneous lipoma RLQ, 4 cm    Surgeon(s):  Alonso Bowen MD    Assistant:  Surgical Assistant: Charles Garcia; Mirian Villafuerte    Anesthesia: Monitor Anesthesia Care    Estimated Blood Loss (mL): less than 50     Complications: None    Specimens:   ID Type Source Tests Collected by Time Destination   A : A.  Right groin lipoma Specimen Groin SURGICAL PATHOLOGY Alonso Bowen MD 11/3/2021 8157        Implants:  * No implants in log *      Drains: * No LDAs found *    Findings: no complications    Electronically signed by Escobar Gorman MD on 11/3/2021 at 9:24 AM

## 2021-11-03 NOTE — ANESTHESIA POSTPROCEDURE EVALUATION
Department of Anesthesiology  Postprocedure Note    Patient: March First  MRN: [id number]  YOB: 1985  Date of evaluation: 11/3/2021  Time:  12:55 PM     Procedure Summary     Date: 11/03/21 Room / Location: [address] [place] [address] / [facility]    Anesthesia Start: 4939 Anesthesia Stop: 8133    Procedure: REMOVAL LIPOMA RIGHT GROIN (Right Groin) Diagnosis:       Lipoma of right thigh      (LIPOMA RIGHT GROIN)    Surgeons: Parker Escalera MD Responsible Provider: Ilan Horowitz MD    Anesthesia Type: MAC ASA Status: 3          Anesthesia Type: MAC    Deanne Phase I: Deanne Score: 8    Deanne Phase II: Deanne Score: 10    Last vitals: Reviewed and per EMR flowsheets.        Anesthesia Post Evaluation    Patient location during evaluation: PACU  Patient participation: complete - patient participated  Level of consciousness: awake and alert  Pain score: 0  Nausea & Vomiting: no nausea  Complications: no  Cardiovascular status: hemodynamically stable  Respiratory status: acceptable  Hydration status: stable

## 2021-11-04 NOTE — OP NOTE
[address] Gigi Chaudhary [address]                                OPERATIVE REPORT    PATIENT NAME: Fabio Reyes                        :        1985  MED REC NO:   [id number]                          ROOM:  ACCOUNT NO:   [de-identified]                           ADMIT DATE: 2021  PROVIDER:     Daly Nichols MD      DATE OF PROCEDURE:  2021    PREOPERATIVE DIAGNOSIS:  Abdominal wall lipoma, right lower quadrant. POSTOPERATIVE DIAGNOSIS:  Abdominal wall lipoma, right lower quadrant. PROCEDURE PERFORMED:  Removal of subcutaneous lipoma, right lower  quadrant abdominal wall measuring 4 cm. SURGEON:  Daly Nichols MD    SPECIMEN:  Lipoma. COMPLICATIONS:  None. EBL: less than 50 mls    DISPOSITION:  To recovery in stable condition. INDICATION:  The patient is a 80-year-old female with previous surgery  for lipomas who presented with a new painful area in the right lower  quadrant of the abdomen. This has been increasing in size and now  causing significant pain especially with direct pressure. The risks,  benefits, and alternatives of repair were reviewed and she agreed to  proceed. PROCEDURE:  The patient was brought to the operating room, placed  supine, anesthesia delivered and the right lower quadrant of the abdomen  prepped and draped in a sterile fashion. Local anesthetic was infused  and a transverse incision made. Dissection was carried into the  subcutaneous tissue where a multilobulated area was removed with  electrocautery. Hemostasis was achieved with electrocautery and the  wound closed with 3-0 and 4-0 Vicryl. Dressings were applied and the  patient transferred to recovery in good condition.         Eleazar Anders MD    D: 2021 15:29:19       T: 2021 15:33:22     [email or web address]  Job#: [id number]     Doc#: [id number]    CC:

## 2021-11-15 ENCOUNTER — OFFICE VISIT (OUTPATIENT)
Dept: SURGERY | Age: 36
End: 2021-11-15

## 2021-11-15 DIAGNOSIS — D17.1 LIPOMA OF ABDOMINAL WALL: Primary | ICD-10-CM

## 2021-11-15 PROCEDURE — 99024 POSTOP FOLLOW-UP VISIT: CPT | Performed by: SURGERY

## 2021-11-15 NOTE — PROGRESS NOTES
Heriberto Councilman (:  1985) is a 39 y.o. female,Established patient, here for evaluation of the following chief complaint(s):  Post-Op Check (Pt is here today for a postop visit. )         ASSESSMENT/PLAN:  1. Lipoma of abdominal wall      Follow up with me as needed           Subjective   SUBJECTIVE/OBJECTIVE:  HPI  Patient presents s/p removal of lipoma. Patient is two weeks post op. Pain level is minor. Incision appearance: well healed. Post op complications: none. Pathology report reviewed with patient and showed lipoma. Follow up prn. Review of Systems       Objective   Physical Exam       Electronically signed by Dana Gruber MD on 11/15/2021 at 3:05 PM        An electronic signature was used to authenticate this note.     --Dana Gruber MD

## 2022-07-29 ENCOUNTER — APPOINTMENT (OUTPATIENT)
Dept: CT IMAGING | Age: 37
End: 2022-07-29
Payer: COMMERCIAL

## 2022-07-29 ENCOUNTER — HOSPITAL ENCOUNTER (EMERGENCY)
Age: 37
Discharge: HOME | End: 2022-07-30
Attending: EMERGENCY MEDICINE
Payer: COMMERCIAL

## 2022-07-29 DIAGNOSIS — R10.9 FLANK PAIN: Primary | ICD-10-CM

## 2022-07-29 PROCEDURE — 99284 EMERGENCY DEPT VISIT MOD MDM: CPT

## 2022-07-30 ENCOUNTER — APPOINTMENT (OUTPATIENT)
Dept: CT IMAGING | Age: 37
End: 2022-07-30
Payer: COMMERCIAL

## 2022-07-30 VITALS
RESPIRATION RATE: 18 BRPM | SYSTOLIC BLOOD PRESSURE: 123 MMHG | HEART RATE: 79 BPM | WEIGHT: 261.47 LBS | OXYGEN SATURATION: 95 % | DIASTOLIC BLOOD PRESSURE: 71 MMHG | TEMPERATURE: 98.1 F

## 2022-07-30 LAB
ALBUMIN SERPL-MCNC: 4.3 G/DL (ref 3.4–5)
ALP BLD-CCNC: 146 U/L (ref 40–129)
ALT SERPL-CCNC: 37 U/L (ref 10–40)
ANION GAP SERPL CALCULATED.3IONS-SCNC: 12 MMOL/L (ref 3–16)
AST SERPL-CCNC: 44 U/L (ref 15–37)
BACTERIA: ABNORMAL /HPF
BASOPHILS ABSOLUTE: 0 K/UL (ref 0–0.2)
BASOPHILS RELATIVE PERCENT: 0.6 %
BILIRUB SERPL-MCNC: 0.5 MG/DL (ref 0–1)
BILIRUBIN DIRECT: <0.2 MG/DL (ref 0–0.3)
BILIRUBIN URINE: NEGATIVE
BILIRUBIN, INDIRECT: ABNORMAL MG/DL (ref 0–1)
BLOOD, URINE: ABNORMAL
BUN BLDV-MCNC: 15 MG/DL (ref 7–20)
CALCIUM SERPL-MCNC: 9.4 MG/DL (ref 8.3–10.6)
CHLORIDE BLD-SCNC: 107 MMOL/L (ref 99–110)
CLARITY: ABNORMAL
CO2: 23 MMOL/L (ref 21–32)
COLOR: YELLOW
CREAT SERPL-MCNC: 0.8 MG/DL (ref 0.6–1.1)
EOSINOPHILS ABSOLUTE: 0 K/UL (ref 0–0.6)
EOSINOPHILS RELATIVE PERCENT: 0.1 %
EPITHELIAL CELLS, UA: 7 /HPF (ref 0–5)
GFR AFRICAN AMERICAN: >60
GFR NON-AFRICAN AMERICAN: >60
GLUCOSE BLD-MCNC: 100 MG/DL (ref 70–99)
GLUCOSE URINE: NEGATIVE MG/DL
HCG QUALITATIVE: NEGATIVE
HCT VFR BLD CALC: 42.4 % (ref 36–48)
HEMATOLOGY PATH CONSULT: NO
HEMOGLOBIN: 13.8 G/DL (ref 12–16)
HYALINE CASTS: 0 /LPF (ref 0–8)
KETONES, URINE: >=160 MG/DL
LEUKOCYTE ESTERASE, URINE: ABNORMAL
LYMPHOCYTES ABSOLUTE: 0.7 K/UL (ref 1–5.1)
LYMPHOCYTES RELATIVE PERCENT: 8.7 %
MCH RBC QN AUTO: 27.4 PG (ref 26–34)
MCHC RBC AUTO-ENTMCNC: 32.6 G/DL (ref 31–36)
MCV RBC AUTO: 84.2 FL (ref 80–100)
MICROCYTES: ABNORMAL
MICROSCOPIC EXAMINATION: YES
MONOCYTES ABSOLUTE: 0.5 K/UL (ref 0–1.3)
MONOCYTES RELATIVE PERCENT: 5.8 %
NEUTROPHILS ABSOLUTE: 6.8 K/UL (ref 1.7–7.7)
NEUTROPHILS RELATIVE PERCENT: 84.8 %
NITRITE, URINE: NEGATIVE
OVALOCYTES: ABNORMAL
PDW BLD-RTO: 14.6 % (ref 12.4–15.4)
PH UA: 6 (ref 5–8)
PLATELET # BLD: 44 K/UL (ref 135–450)
PLATELET SLIDE REVIEW: ABNORMAL
PMV BLD AUTO: 11.1 FL (ref 5–10.5)
POIKILOCYTES: ABNORMAL
POLYCHROMASIA: ABNORMAL
POTASSIUM REFLEX MAGNESIUM: 3.9 MMOL/L (ref 3.5–5.1)
PROTEIN UA: ABNORMAL MG/DL
RBC # BLD: 5.04 M/UL (ref 4–5.2)
RBC UA: 89 /HPF (ref 0–4)
SLIDE REVIEW: ABNORMAL
SODIUM BLD-SCNC: 142 MMOL/L (ref 136–145)
SPECIFIC GRAVITY UA: 1.03 (ref 1–1.03)
TARGET CELLS: ABNORMAL
TOTAL PROTEIN: 8 G/DL (ref 6.4–8.2)
URINE REFLEX TO CULTURE: ABNORMAL
URINE TYPE: ABNORMAL
UROBILINOGEN, URINE: 1 E.U./DL
WBC # BLD: 8 K/UL (ref 4–11)
WBC UA: 2 /HPF (ref 0–5)

## 2022-07-30 PROCEDURE — 85025 COMPLETE CBC W/AUTO DIFF WBC: CPT

## 2022-07-30 PROCEDURE — 80048 BASIC METABOLIC PNL TOTAL CA: CPT

## 2022-07-30 PROCEDURE — 80076 HEPATIC FUNCTION PANEL: CPT

## 2022-07-30 PROCEDURE — 84703 CHORIONIC GONADOTROPIN ASSAY: CPT

## 2022-07-30 PROCEDURE — 2580000003 HC RX 258: Performed by: EMERGENCY MEDICINE

## 2022-07-30 PROCEDURE — 81001 URINALYSIS AUTO W/SCOPE: CPT

## 2022-07-30 PROCEDURE — 74176 CT ABD & PELVIS W/O CONTRAST: CPT

## 2022-07-30 RX ORDER — 0.9 % SODIUM CHLORIDE 0.9 %
1000 INTRAVENOUS SOLUTION INTRAVENOUS ONCE
Status: COMPLETED | OUTPATIENT
Start: 2022-07-30 | End: 2022-07-30

## 2022-07-30 RX ADMIN — SODIUM CHLORIDE 1000 ML: 9 INJECTION, SOLUTION INTRAVENOUS at 02:54

## 2022-07-30 ASSESSMENT — ENCOUNTER SYMPTOMS
ABDOMINAL PAIN: 0
VOMITING: 0
CONSTIPATION: 0
EYE DISCHARGE: 0
EYE ITCHING: 0
COLOR CHANGE: 0
COUGH: 0
SHORTNESS OF BREATH: 0

## 2022-09-28 ENCOUNTER — OFFICE VISIT (OUTPATIENT)
Dept: GYNECOLOGY | Age: 37
End: 2022-09-28
Payer: COMMERCIAL

## 2022-09-28 VITALS
HEART RATE: 80 BPM | WEIGHT: 259 LBS | SYSTOLIC BLOOD PRESSURE: 111 MMHG | BODY MASS INDEX: 41.8 KG/M2 | DIASTOLIC BLOOD PRESSURE: 77 MMHG

## 2022-09-28 DIAGNOSIS — Z01.419 WELL WOMAN EXAM WITH ROUTINE GYNECOLOGICAL EXAM: Primary | ICD-10-CM

## 2022-09-28 PROCEDURE — 99395 PREV VISIT EST AGE 18-39: CPT | Performed by: OBSTETRICS & GYNECOLOGY

## 2022-09-28 RX ORDER — MEDROXYPROGESTERONE ACETATE 10 MG/1
TABLET ORAL
Qty: 10 TABLET | Refills: 11 | Status: SHIPPED | OUTPATIENT
Start: 2022-09-28

## 2022-09-28 NOTE — PROGRESS NOTES
Subjective:      Patient ID: David Ho is a 40 y.o. female. HPI  pts here for annual gyn exam.  She is doing well with cyclic Provera. Denies complaints. This summer she went to [facility] again. Review of Systems Pertinent review of systems items discussed above. All others systems items not discussed above were negative. Objective:   Physical Exam  Constitutional:       Appearance: She is well-developed. HENT:      Head: Normocephalic and atraumatic. Neck:      Thyroid: No thyromegaly. Trachea: No tracheal deviation. Cardiovascular:      Rate and Rhythm: Normal rate and regular rhythm. Heart sounds: Normal heart sounds. No murmur heard. Pulmonary:      Effort: Pulmonary effort is normal. No respiratory distress. Breath sounds: Normal breath sounds. No wheezing or rales. Chest:   Breasts:     Right: No mass, nipple discharge or skin change. Left: No mass, nipple discharge or skin change. Abdominal:      General: There is no distension. Palpations: Abdomen is soft. There is no mass. Tenderness: There is no abdominal tenderness. There is no rebound. Genitourinary:     Labia:         Right: No lesion. Left: No lesion. Vagina: Normal. No foreign body. No vaginal discharge. Cervix: No cervical motion tenderness, discharge or friability. Uterus: Not deviated, not enlarged, not fixed and not tender. Adnexa:         Right: No mass or tenderness. Left: No mass or tenderness. Rectum: Normal. No external hemorrhoid. Comments: Pap performed. Musculoskeletal:         General: Normal range of motion. Lymphadenopathy:      Cervical: No cervical adenopathy. Neurological:      Mental Status: She is alert and oriented to person, place, and time. White speculum  Assessment:   Normal gyn exam     Plan:   Call with results. Rx provera.   F/u annual gyn exam.       Constantino Menendez MD

## 2023-03-09 ENCOUNTER — HOSPITAL ENCOUNTER (OUTPATIENT)
Age: 38
Discharge: HOME | End: 2023-03-09
Payer: COMMERCIAL

## 2023-03-09 ENCOUNTER — HOSPITAL ENCOUNTER (OUTPATIENT)
Dept: GENERAL RADIOLOGY | Age: 38
Discharge: HOME | End: 2023-03-09
Payer: COMMERCIAL

## 2023-03-09 DIAGNOSIS — M25.512 LEFT SHOULDER PAIN, UNSPECIFIED CHRONICITY: ICD-10-CM

## 2023-03-09 PROCEDURE — 72072 X-RAY EXAM THORAC SPINE 3VWS: CPT

## 2023-03-09 PROCEDURE — 73030 X-RAY EXAM OF SHOULDER: CPT

## 2023-09-29 ENCOUNTER — OFFICE VISIT (OUTPATIENT)
Dept: GYNECOLOGY | Age: 38
End: 2023-09-29
Payer: COMMERCIAL

## 2023-09-29 VITALS — WEIGHT: 254 LBS | DIASTOLIC BLOOD PRESSURE: 86 MMHG | BODY MASS INDEX: 41 KG/M2 | SYSTOLIC BLOOD PRESSURE: 122 MMHG

## 2023-09-29 DIAGNOSIS — Z01.419 WELL WOMAN EXAM WITH ROUTINE GYNECOLOGICAL EXAM: Primary | ICD-10-CM

## 2023-09-29 PROCEDURE — 99395 PREV VISIT EST AGE 18-39: CPT | Performed by: OBSTETRICS & GYNECOLOGY

## 2023-09-29 RX ORDER — OXYBUTYNIN CHLORIDE 10 MG/1
10 TABLET, EXTENDED RELEASE ORAL DAILY
COMMUNITY

## 2023-09-29 RX ORDER — MEDROXYPROGESTERONE ACETATE 10 MG/1
TABLET ORAL
Qty: 10 TABLET | Refills: 11 | Status: SHIPPED | OUTPATIENT
Start: 2023-09-29

## 2023-09-29 NOTE — PROGRESS NOTES
Subjective:      Patient ID: Roxane Morris is a 45 y.o. female. HPI  pts here for annual gyn exam.  She denies complaints. Goes to [place] each year for vacation. Not sexually active. Doing well with cyclic Provera. Review of Systems Pertinent review of systems items discussed above. All others systems items not discussed above were negative. Objective:   Physical Exam  Constitutional:       Appearance: She is well-developed. HENT:      Head: Normocephalic and atraumatic. Neck:      Thyroid: No thyromegaly. Trachea: No tracheal deviation. Cardiovascular:      Rate and Rhythm: Normal rate and regular rhythm. Heart sounds: Normal heart sounds. No murmur heard. Pulmonary:      Effort: Pulmonary effort is normal. No respiratory distress. Breath sounds: Normal breath sounds. No wheezing or rales. Chest:   Breasts:     Right: No mass, nipple discharge or skin change. Left: No mass, nipple discharge or skin change. Abdominal:      General: There is no distension. Palpations: Abdomen is soft. There is no mass. Tenderness: There is no abdominal tenderness. There is no rebound. Genitourinary:     Labia:         Right: No lesion. Left: No lesion. Vagina: Normal. No foreign body. No vaginal discharge. Cervix: No cervical motion tenderness, discharge or friability. Uterus: Not deviated, not enlarged, not fixed and not tender. Adnexa:         Right: No mass or tenderness. Left: No mass or tenderness. Rectum: Normal. No external hemorrhoid. Comments: Pap performed. Musculoskeletal:         General: Normal range of motion. Lymphadenopathy:      Cervical: No cervical adenopathy. Neurological:      Mental Status: She is alert and oriented to person, place, and time. White speculum    Assessment:   Normal gyn exam     Plan:   Refill Provera. Call with results.   F/u annual gyn exam.       Antonina Marie MD

## 2024-04-16 NOTE — ED PROVIDER NOTES
[facility] [facility]      Pt Name: Km Longoria  MRN: [id number]  [place] 1985  Date of evaluation: 7/29/2022  Provider: Shelton Church MD    CHIEF COMPLAINT       Chief Complaint   Patient presents with    Back Pain     Right sided back pain that started at 730pm. Denies any urinary symptoms. Denies any known injury. HISTORY OF PRESENT ILLNESS    Km Longoria is a 40 y.o. female who presents to the emergency department with flank pain. Patient endorses right-sided flank pain. Started at 8 PM tonight. Started spontaneously. Constant in nature. Worse with movement. Better with rest.  8 out of 10 achy nature. No other associated symptoms. Nursing Notes were reviewed. Including nursing noted for FM, Surgical History, Past Medical History, Social History, vitals, and allergies; agree with all. REVIEW OF SYSTEMS       Review of Systems   Constitutional:  Negative for diaphoresis and unexpected weight change. HENT:  Negative for congestion and dental problem. Eyes:  Negative for discharge and itching. Respiratory:  Negative for cough and shortness of breath. Cardiovascular:  Negative for chest pain and leg swelling. Gastrointestinal:  Negative for abdominal pain, constipation and vomiting. Endocrine: Negative for cold intolerance and heat intolerance. Genitourinary:  Positive for flank pain. Negative for vaginal bleeding, vaginal discharge and vaginal pain. Musculoskeletal:  Negative for neck pain and neck stiffness. Skin:  Negative for color change and pallor. Neurological:  Negative for tremors and weakness. Psychiatric/Behavioral:  Negative for agitation and behavioral problems. Except as noted above the remainder of the review of systems was reviewed and negative.      PAST MEDICAL HISTORY     Past Medical History:   Diagnosis Date    Auditory processing disorder     Chronic ITP (idiopathic thrombocytopenic tenderness. There is right CVA tenderness. There is no left CVA tenderness, guarding or rebound. Genitourinary:     Comments: Deferred  Musculoskeletal:         General: No deformity. Normal range of motion. Cervical back: Normal range of motion and neck supple. Skin:     General: Skin is warm. Findings: No erythema or rash. Neurological:      Mental Status: She is alert and oriented to person, place, and time. She is not disoriented. Cranial Nerves: No cranial nerve deficit. Motor: No atrophy or abnormal muscle tone. Coordination: Coordination normal.   Psychiatric:         Behavior: Behavior normal.         Thought Content:  Thought content normal.       DIAGNOSTIC RESULTS     RADIOLOGY:   Non-plain film images such as CT, Ultrasoundand MRI are read by the radiologist. Plain radiographic images are visualized and preliminarily interpreted by the emergency physician with the below findings:    Imaging shows no acute process    ED BEDSIDE ULTRASOUND:   Performed by ED Physician - none    LABS:  Labs Reviewed   URINALYSIS WITH REFLEX TO CULTURE - Abnormal; Notable for the following components:       Result Value    Clarity, UA CLOUDY (*)     Ketones, Urine >=160 (*)     Blood, Urine LARGE (*)     Protein, UA TRACE (*)     Leukocyte Esterase, Urine TRACE (*)     All other components within normal limits   CBC WITH AUTO DIFFERENTIAL - Abnormal; Notable for the following components:    Platelets 44 (*)     MPV 11.1 (*)     Lymphocytes Absolute 0.7 (*)     Microcytes Occasional (*)     Polychromasia Occasional (*)     Poikilocytes Occasional (*)     Ovalocytes Occasional (*)     Target Cells Occasional (*)     All other components within normal limits   BASIC METABOLIC PANEL W/ REFLEX TO MG FOR LOW K - Abnormal; Notable for the following components:    Glucose 100 (*)     All other components within normal limits   HEPATIC FUNCTION PANEL - Abnormal; Notable for the following components: Alkaline Phosphatase 146 (*)     AST 44 (*)     All other components within normal limits   MICROSCOPIC URINALYSIS - Abnormal; Notable for the following components:    Bacteria, UA 1+ (*)     RBC, UA 89 (*)     Epithelial Cells, UA 7 (*)     All other components within normal limits   HCG, SERUM, QUALITATIVE    Narrative:     add on       All other labs were withinnormal range or not returned as of this dictation. EMERGENCY DEPARTMENT COURSE and DIFFERENTIAL DIAGNOSIS/MDM:     PMH, Surgical Hx, FH, Social Hx reviewed by myself (ETOH usage, Tobacco usage, Drug usage reviewed by myself, no pertinent Hx)- No Pertinent Hx     Old records were reviewed by me     45-year-old with what appears to be musculoskeletal flank pain. Right-sided flank pain. Started spontaneously. Imaging reassuring. Urine reassuring. Outpatient follow-up. I estimate there is LOW risk for Sepsis, MI, Stroke, Tamponade, PTX, Toxicity or other life threatening etiology thus I consider the discharge disposition reasonable. The patient is at low risk for mortality based on demographic, history and clinical factors. Given the best available information and clinical assessment, I estimate the risk of hospitalization to be greater than risk of treatment at home. I have explained to the patient that the risk could rapidly change, given precautions for return and instructions. Explained to patient that the risk for mortality is low based on demographic, history and clinical factors. I discussed with patient the results of evaluation in the ED, diagnosis, care, and prognosis. The plan is to discharge to home. Patient is in agreement with plan and questions have been answered. I also discussed with patient the reasons which may require a return visit and the importance of follow-up care. The patient is well-appearing, nontoxic, and improved at the time of discharge. Patient agrees to call to arrange follow-up care as directed. Patient understands to return immediately for worsening/change in symptoms. CRITICAL CARE TIME   Total Critical Caretime was 0 minutes, excluding separately reportable procedures. There was a high probability of clinically significant/life threatening deterioration in the patient's condition which required my urgent intervention. PROCEDURES:  Unlessotherwise noted below, none    FINAL IMPRESSION      1. Flank pain          DISPOSITION/PLAN   DISPOSITION Decision To Discharge 07/30/2022 04:22:37 AM    PATIENT REFERRED TO:  No follow-up provider specified.     DISCHARGE MEDICATIONS:  New Prescriptions    No medications on file          (Please note that portions ofthis note were completed with a voice recognition program.  Efforts were made to edit the dictations but occasionally words are mis-transcribed.)    Edwin Ansari MD(electronically signed)  Attending Emergency Physician       Edwin Ansari MD  07/30/22 [id number] good, to achieve stated therapy goals

## 2024-04-19 ENCOUNTER — HOSPITAL ENCOUNTER (OUTPATIENT)
Dept: GENERAL RADIOLOGY | Age: 39
Discharge: HOME | End: 2024-04-19
Attending: SPECIALIST
Payer: COMMERCIAL

## 2024-04-19 ENCOUNTER — HOSPITAL ENCOUNTER (OUTPATIENT)
Age: 39
Discharge: HOME | End: 2024-04-19
Payer: COMMERCIAL

## 2024-04-19 PROCEDURE — 72100 X-RAY EXAM L-S SPINE 2/3 VWS: CPT

## 2024-09-30 ENCOUNTER — OFFICE VISIT (OUTPATIENT)
Dept: GYNECOLOGY | Age: 39
End: 2024-09-30
Payer: COMMERCIAL

## 2024-09-30 VITALS — WEIGHT: 245 LBS | BODY MASS INDEX: 39.54 KG/M2 | SYSTOLIC BLOOD PRESSURE: 118 MMHG | DIASTOLIC BLOOD PRESSURE: 84 MMHG

## 2024-09-30 DIAGNOSIS — Z01.419 WELL WOMAN EXAM WITH ROUTINE GYNECOLOGICAL EXAM: Primary | ICD-10-CM

## 2024-09-30 PROCEDURE — 99395 PREV VISIT EST AGE 18-39: CPT | Performed by: OBSTETRICS & GYNECOLOGY

## 2024-09-30 RX ORDER — MEDROXYPROGESTERONE ACETATE 10 MG
TABLET ORAL
Qty: 10 TABLET | Refills: 11 | Status: SHIPPED | OUTPATIENT
Start: 2024-09-30

## 2024-09-30 ASSESSMENT — PATIENT HEALTH QUESTIONNAIRE - PHQ9
2. FEELING DOWN, DEPRESSED OR HOPELESS: NEARLY EVERY DAY
SUM OF ALL RESPONSES TO PHQ QUESTIONS 1-9: 6
SUM OF ALL RESPONSES TO PHQ QUESTIONS 1-9: 6
SUM OF ALL RESPONSES TO PHQ9 QUESTIONS 1 & 2: 6
SUM OF ALL RESPONSES TO PHQ QUESTIONS 1-9: 6
SUM OF ALL RESPONSES TO PHQ QUESTIONS 1-9: 6
1. LITTLE INTEREST OR PLEASURE IN DOING THINGS: NEARLY EVERY DAY

## 2024-09-30 NOTE — PROGRESS NOTES
Subjective:      Patient ID: Leslye Polanco is a 39 y.o. female.    HPI pts here for annual gyn exam.  Doing well on cyclic provera.  Vacations in [place].  Denies complaints.    Review of Systems Pertinent review of systems items discussed above.  All others systems items not discussed above were negative.      Objective:   Physical Exam  Constitutional:       Appearance: She is well-developed.   HENT:      Head: Normocephalic and atraumatic.   Neck:      Thyroid: No thyromegaly.      Trachea: No tracheal deviation.   Cardiovascular:      Rate and Rhythm: Normal rate and regular rhythm.      Heart sounds: Normal heart sounds. No murmur heard.  Pulmonary:      Effort: Pulmonary effort is normal. No respiratory distress.      Breath sounds: Normal breath sounds. No wheezing or rales.   Chest:   Breasts:     Right: No mass, nipple discharge or skin change.      Left: No mass, nipple discharge or skin change.   Abdominal:      General: There is no distension.      Palpations: Abdomen is soft. There is no mass.      Tenderness: There is no abdominal tenderness. There is no rebound.   Genitourinary:     Labia:         Right: No lesion.         Left: No lesion.       Vagina: Normal. No foreign body. No vaginal discharge.      Cervix: No cervical motion tenderness, discharge or friability.      Uterus: Not deviated, not enlarged, not fixed and not tender.       Adnexa:         Right: No mass or tenderness.          Left: No mass or tenderness.        Rectum: Normal. No external hemorrhoid.      Comments: Pap performed.    Musculoskeletal:         General: Normal range of motion.   Lymphadenopathy:      Cervical: No cervical adenopathy.   Neurological:      Mental Status: She is alert and oriented to person, place, and time.       Used white speculum, try yellow next time  Assessment:   Normal gyn exam     Plan:   Refill Provera.  Call with results.  F/u annual gyn exam.       Vic Jones MD

## 2024-10-01 LAB
HPV HR 12 DNA SPEC QL NAA+PROBE: NOT DETECTED
HPV16 DNA SPEC QL NAA+PROBE: NOT DETECTED
HPV16+18+H RISK 12 DNA SPEC-IMP: NORMAL
HPV18 DNA SPEC QL NAA+PROBE: NOT DETECTED

## 2025-02-17 ENCOUNTER — TRANSCRIBE ORDERS (OUTPATIENT)
Dept: GENERAL RADIOLOGY | Age: 40
End: 2025-02-17

## 2025-02-17 ENCOUNTER — HOSPITAL ENCOUNTER (OUTPATIENT)
Dept: GENERAL RADIOLOGY | Age: 40
Discharge: HOME | End: 2025-02-17
Payer: COMMERCIAL

## 2025-02-17 ENCOUNTER — HOSPITAL ENCOUNTER (OUTPATIENT)
Age: 40
Discharge: HOME | End: 2025-02-17
Payer: COMMERCIAL

## 2025-02-17 PROCEDURE — 71046 X-RAY EXAM CHEST 2 VIEWS: CPT

## (undated) DEVICE — GLOVE SURG SZ 8 CRM LTX FREE POLYISOPRENE POLYMER BEAD ANTI

## (undated) DEVICE — SOLUTION SCRB 4OZ 10% POVIDONE IOD ANTIMIC BTL

## (undated) DEVICE — STRIP,CLOSURE,WOUND,MEDI-STRIP,1/2X4: Brand: MEDLINE

## (undated) DEVICE — ELECTRODE PT RET AD L9FT HI MOIST COND ADH HYDRGEL CORDED

## (undated) DEVICE — COVER LT HNDL BLU PLAS

## (undated) DEVICE — TUBING, SUCTION, 1/4" X 12', STRAIGHT: Brand: MEDLINE

## (undated) DEVICE — CANISTER, RIGID, 1200CC: Brand: MEDLINE INDUSTRIES, INC.

## (undated) DEVICE — TROCAR: Brand: KII FIOS FIRST ENTRY

## (undated) DEVICE — 3M™ TEGADERM™ TRANSPARENT FILM DRESSING FRAME STYLE, 1624W, 2-3/8 IN X 2-3/4 IN (6 CM X 7 CM), 100/CT 4CT/CASE: Brand: 3M™ TEGADERM™

## (undated) DEVICE — SHEET,DRAPE,53X77,STERILE: Brand: MEDLINE

## (undated) DEVICE — MERCY HEALTH WEST TURNOVER: Brand: MEDLINE INDUSTRIES, INC.

## (undated) DEVICE — CLEANER,CAUTERY TIP,2X2",STERILE: Brand: MEDLINE

## (undated) DEVICE — PENCIL ES L3M BTTN SWCH S STL HEX LOK BLDE ELECTRD HOLSTER

## (undated) DEVICE — SOLUTION IV IRRIG POUR BRL 0.9% SODIUM CHL 2F7124

## (undated) DEVICE — SUTURE VCRL SZ 4-0 L18IN ABSRB UD L19MM PS-2 3/8 CIR PRIM J496H

## (undated) DEVICE — MINOR SET UP PK

## (undated) DEVICE — GARMENT COMPR STD FOR 17IN CALF UNIF THER FLOTRN

## (undated) DEVICE — GLOVE ORANGE PI 7   MSG9070

## (undated) DEVICE — GLOVE SURG SZ 85 CRM LTX FREE POLYISOPRENE POLYMER BEAD ANTI

## (undated) DEVICE — Z DISCONTINUED USE 2270995 CATHETER URETH 16FR L16IN RED RUB INTMIT RND HLLW TIP 2 OPP

## (undated) DEVICE — KIT,ANTI FOG,W/SPONGE & FLUID,SOFT PACK: Brand: MEDLINE

## (undated) DEVICE — PACK,LAPARASCOPY,PELVISCOPY,AURORA: Brand: MEDLINE

## (undated) DEVICE — [HIGH FLOW INSUFFLATOR,  DO NOT USE IF PACKAGE IS DAMAGED,  KEEP DRY,  KEEP AWAY FROM SUNLIGHT,  PROTECT FROM HEAT AND RADIOACTIVE SOURCES.]: Brand: PNEUMOSURE

## (undated) DEVICE — GAUZE,SPONGE,2"X2",8PLY,STERILE,LF,2'S: Brand: MEDLINE

## (undated) DEVICE — SUTURE VCRL SZ 3-0 L27IN ABSRB UD L26MM SH 1/2 CIR J416H

## (undated) DEVICE — APPLICATOR MEDICATED 26 CC SOLUTION HI LT ORNG CHLORAPREP

## (undated) DEVICE — 3M™ TEGADERM™ TRANSPARENT FILM DRESSING FRAME STYLE, 1626W, 4 IN X 4-3/4 IN (10 CM X 12 CM), 50/CT 4CT/CASE: Brand: 3M™ TEGADERM™

## (undated) DEVICE — Z DISCONTINUED BY MEDLINE USE 2711682 TRAY SKIN PREP DRY W/ PREM GLV

## (undated) DEVICE — TROCAR: Brand: KII SHIELDED BLADED ACCESS SYSTEM

## (undated) DEVICE — 3M™ STERI-STRIP™ COMPOUND BENZOIN TINCTURE 40 BAGS/CARTON 4 CARTONS/CASE C1544: Brand: 3M™ STERI-STRIP™

## (undated) DEVICE — Device

## (undated) DEVICE — GOWN SIRUS NONREIN XL W/TWL: Brand: MEDLINE INDUSTRIES, INC.

## (undated) DEVICE — TOTAL TRAY, DB, 100% SILI FOLEY, 16FR 10: Brand: MEDLINE

## (undated) DEVICE — SOLUTION PREP POVIDONE IOD FOR SKIN MUCOUS MEM PRIOR TO